# Patient Record
Sex: MALE | Race: WHITE | NOT HISPANIC OR LATINO | Employment: OTHER | URBAN - METROPOLITAN AREA
[De-identification: names, ages, dates, MRNs, and addresses within clinical notes are randomized per-mention and may not be internally consistent; named-entity substitution may affect disease eponyms.]

---

## 2023-09-08 ENCOUNTER — OFFICE VISIT (OUTPATIENT)
Dept: PRIMARY CARE | Facility: CLINIC | Age: 68
End: 2023-09-08
Payer: MEDICARE

## 2023-09-08 ENCOUNTER — LAB (OUTPATIENT)
Dept: LAB | Facility: LAB | Age: 68
End: 2023-09-08
Payer: MEDICARE

## 2023-09-08 VITALS
WEIGHT: 143 LBS | TEMPERATURE: 97.7 F | HEIGHT: 71 IN | SYSTOLIC BLOOD PRESSURE: 136 MMHG | HEART RATE: 68 BPM | BODY MASS INDEX: 20.02 KG/M2 | DIASTOLIC BLOOD PRESSURE: 80 MMHG

## 2023-09-08 DIAGNOSIS — Z12.11 SCREENING FOR COLON CANCER: ICD-10-CM

## 2023-09-08 DIAGNOSIS — Z12.5 SCREENING FOR PROSTATE CANCER: ICD-10-CM

## 2023-09-08 DIAGNOSIS — J45.20 MILD INTERMITTENT ASTHMA, UNSPECIFIED WHETHER COMPLICATED (HHS-HCC): ICD-10-CM

## 2023-09-08 DIAGNOSIS — R53.81 MALAISE: ICD-10-CM

## 2023-09-08 DIAGNOSIS — R03.0 PREHYPERTENSION: ICD-10-CM

## 2023-09-08 DIAGNOSIS — E55.9 VITAMIN D DEFICIENCY: ICD-10-CM

## 2023-09-08 DIAGNOSIS — G20.A1 PARKINSON'S DISEASE (MULTI): Primary | ICD-10-CM

## 2023-09-08 DIAGNOSIS — E78.2 HYPERLIPEMIA, MIXED: ICD-10-CM

## 2023-09-08 DIAGNOSIS — E46 PROTEIN-CALORIE MALNUTRITION, UNSPECIFIED SEVERITY (MULTI): ICD-10-CM

## 2023-09-08 DIAGNOSIS — Z23 NEED FOR INFLUENZA VACCINATION: ICD-10-CM

## 2023-09-08 DIAGNOSIS — R97.20 ELEVATED PSA: ICD-10-CM

## 2023-09-08 PROBLEM — J45.909 ASTHMA (HHS-HCC): Status: ACTIVE | Noted: 2023-09-08

## 2023-09-08 PROBLEM — N52.9 ERECTILE DYSFUNCTION: Status: ACTIVE | Noted: 2023-09-08

## 2023-09-08 LAB
ALANINE AMINOTRANSFERASE (SGPT) (U/L) IN SER/PLAS: 15 U/L (ref 10–52)
ALBUMIN (G/DL) IN SER/PLAS: 4.3 G/DL (ref 3.4–5)
ALBUMIN (MG/L) IN URINE: <7 MG/L
ALBUMIN/CREATININE (UG/MG) IN URINE: NORMAL UG/MG CRT (ref 0–30)
ALKALINE PHOSPHATASE (U/L) IN SER/PLAS: 75 U/L (ref 33–136)
ANION GAP IN SER/PLAS: 13 MMOL/L (ref 10–20)
APPEARANCE, URINE: CLEAR
ASPARTATE AMINOTRANSFERASE (SGOT) (U/L) IN SER/PLAS: 16 U/L (ref 9–39)
BASOPHILS (10*3/UL) IN BLOOD BY AUTOMATED COUNT: 0.05 X10E9/L (ref 0–0.1)
BASOPHILS/100 LEUKOCYTES IN BLOOD BY AUTOMATED COUNT: 1 % (ref 0–2)
BILIRUBIN TOTAL (MG/DL) IN SER/PLAS: 0.7 MG/DL (ref 0–1.2)
BILIRUBIN, URINE: NEGATIVE
BLOOD, URINE: NEGATIVE
CALCIDIOL (25 OH VITAMIN D3) (NG/ML) IN SER/PLAS: 50 NG/ML
CALCIUM (MG/DL) IN SER/PLAS: 9.7 MG/DL (ref 8.6–10.6)
CARBON DIOXIDE, TOTAL (MMOL/L) IN SER/PLAS: 31 MMOL/L (ref 21–32)
CHLORIDE (MMOL/L) IN SER/PLAS: 103 MMOL/L (ref 98–107)
CHOLESTEROL (MG/DL) IN SER/PLAS: 145 MG/DL (ref 0–199)
CHOLESTEROL IN HDL (MG/DL) IN SER/PLAS: 72 MG/DL
CHOLESTEROL/HDL RATIO: 2
COLOR, URINE: YELLOW
CREATININE (MG/DL) IN SER/PLAS: 0.85 MG/DL (ref 0.5–1.3)
CREATININE (MG/DL) IN URINE: 90.1 MG/DL (ref 20–370)
EOSINOPHILS (10*3/UL) IN BLOOD BY AUTOMATED COUNT: 0.11 X10E9/L (ref 0–0.7)
EOSINOPHILS/100 LEUKOCYTES IN BLOOD BY AUTOMATED COUNT: 2.2 % (ref 0–6)
ERYTHROCYTE DISTRIBUTION WIDTH (RATIO) BY AUTOMATED COUNT: 13 % (ref 11.5–14.5)
ERYTHROCYTE MEAN CORPUSCULAR HEMOGLOBIN CONCENTRATION (G/DL) BY AUTOMATED: 32.9 G/DL (ref 32–36)
ERYTHROCYTE MEAN CORPUSCULAR VOLUME (FL) BY AUTOMATED COUNT: 96 FL (ref 80–100)
ERYTHROCYTES (10*6/UL) IN BLOOD BY AUTOMATED COUNT: 4.39 X10E12/L (ref 4.5–5.9)
GFR MALE: >90 ML/MIN/1.73M2
GLUCOSE (MG/DL) IN SER/PLAS: 79 MG/DL (ref 74–99)
GLUCOSE, URINE: NEGATIVE MG/DL
HEMATOCRIT (%) IN BLOOD BY AUTOMATED COUNT: 42 % (ref 41–52)
HEMOGLOBIN (G/DL) IN BLOOD: 13.8 G/DL (ref 13.5–17.5)
IMMATURE GRANULOCYTES/100 LEUKOCYTES IN BLOOD BY AUTOMATED COUNT: 0 % (ref 0–0.9)
KETONES, URINE: NEGATIVE MG/DL
LDL: 64 MG/DL (ref 0–99)
LEUKOCYTE ESTERASE, URINE: NEGATIVE
LEUKOCYTES (10*3/UL) IN BLOOD BY AUTOMATED COUNT: 5 X10E9/L (ref 4.4–11.3)
LYMPHOCYTES (10*3/UL) IN BLOOD BY AUTOMATED COUNT: 2.24 X10E9/L (ref 1.2–4.8)
LYMPHOCYTES/100 LEUKOCYTES IN BLOOD BY AUTOMATED COUNT: 45.1 % (ref 13–44)
MONOCYTES (10*3/UL) IN BLOOD BY AUTOMATED COUNT: 0.5 X10E9/L (ref 0.1–1)
MONOCYTES/100 LEUKOCYTES IN BLOOD BY AUTOMATED COUNT: 10.1 % (ref 2–10)
NEUTROPHILS (10*3/UL) IN BLOOD BY AUTOMATED COUNT: 2.07 X10E9/L (ref 1.2–7.7)
NEUTROPHILS/100 LEUKOCYTES IN BLOOD BY AUTOMATED COUNT: 41.6 % (ref 40–80)
NITRITE, URINE: NEGATIVE
NRBC (PER 100 WBCS) BY AUTOMATED COUNT: 0 /100 WBC (ref 0–0)
PH, URINE: 7 (ref 5–8)
PLATELETS (10*3/UL) IN BLOOD AUTOMATED COUNT: 206 X10E9/L (ref 150–450)
POTASSIUM (MMOL/L) IN SER/PLAS: 4 MMOL/L (ref 3.5–5.3)
PROSTATE SPECIFIC AG (NG/ML) IN SER/PLAS: 5.04 NG/ML (ref 0–4)
PROTEIN TOTAL: 7.2 G/DL (ref 6.4–8.2)
PROTEIN, URINE: NEGATIVE MG/DL
SODIUM (MMOL/L) IN SER/PLAS: 143 MMOL/L (ref 136–145)
SPECIFIC GRAVITY, URINE: 1.02 (ref 1–1.03)
THYROTROPIN (MIU/L) IN SER/PLAS BY DETECTION LIMIT <= 0.05 MIU/L: 4.24 MIU/L (ref 0.44–3.98)
THYROXINE (T4) FREE (NG/DL) IN SER/PLAS: 1.2 NG/DL (ref 0.78–1.48)
TRIGLYCERIDE (MG/DL) IN SER/PLAS: 47 MG/DL (ref 0–149)
URATE (MG/DL) IN SER/PLAS: 6 MG/DL (ref 4–7.5)
UREA NITROGEN (MG/DL) IN SER/PLAS: 26 MG/DL (ref 6–23)
UROBILINOGEN, URINE: <2 MG/DL (ref 0–1.9)
VLDL: 9 MG/DL (ref 0–40)

## 2023-09-08 PROCEDURE — 1160F RVW MEDS BY RX/DR IN RCRD: CPT | Performed by: INTERNAL MEDICINE

## 2023-09-08 PROCEDURE — 80061 LIPID PANEL: CPT

## 2023-09-08 PROCEDURE — 85025 COMPLETE CBC W/AUTO DIFF WBC: CPT

## 2023-09-08 PROCEDURE — 93000 ELECTROCARDIOGRAM COMPLETE: CPT | Performed by: INTERNAL MEDICINE

## 2023-09-08 PROCEDURE — 82306 VITAMIN D 25 HYDROXY: CPT

## 2023-09-08 PROCEDURE — 99397 PER PM REEVAL EST PAT 65+ YR: CPT | Performed by: INTERNAL MEDICINE

## 2023-09-08 PROCEDURE — 82570 ASSAY OF URINE CREATININE: CPT

## 2023-09-08 PROCEDURE — G0103 PSA SCREENING: HCPCS

## 2023-09-08 PROCEDURE — 84550 ASSAY OF BLOOD/URIC ACID: CPT

## 2023-09-08 PROCEDURE — 84443 ASSAY THYROID STIM HORMONE: CPT

## 2023-09-08 PROCEDURE — 90662 IIV NO PRSV INCREASED AG IM: CPT | Performed by: INTERNAL MEDICINE

## 2023-09-08 PROCEDURE — 36415 COLL VENOUS BLD VENIPUNCTURE: CPT

## 2023-09-08 PROCEDURE — 80053 COMPREHEN METABOLIC PANEL: CPT

## 2023-09-08 PROCEDURE — 84439 ASSAY OF FREE THYROXINE: CPT

## 2023-09-08 PROCEDURE — 1036F TOBACCO NON-USER: CPT | Performed by: INTERNAL MEDICINE

## 2023-09-08 PROCEDURE — G0008 ADMIN INFLUENZA VIRUS VAC: HCPCS | Performed by: INTERNAL MEDICINE

## 2023-09-08 PROCEDURE — 82043 UR ALBUMIN QUANTITATIVE: CPT

## 2023-09-08 PROCEDURE — 81003 URINALYSIS AUTO W/O SCOPE: CPT

## 2023-09-08 PROCEDURE — 1159F MED LIST DOCD IN RCRD: CPT | Performed by: INTERNAL MEDICINE

## 2023-09-08 RX ORDER — AZELASTINE 1 MG/ML
1 SPRAY, METERED NASAL
COMMUNITY
Start: 2021-11-15

## 2023-09-08 RX ORDER — FLUTICASONE PROPIONATE 50 MCG
2 SPRAY, SUSPENSION (ML) NASAL DAILY
COMMUNITY
Start: 2021-02-09

## 2023-09-08 RX ORDER — DORZOLAMIDE HYDROCHLORIDE AND TIMOLOL MALEATE 20; 5 MG/ML; MG/ML
1 SOLUTION/ DROPS OPHTHALMIC 2 TIMES DAILY
COMMUNITY
Start: 2023-05-22

## 2023-09-08 SDOH — HEALTH STABILITY: PHYSICAL HEALTH: ON AVERAGE, HOW MANY DAYS PER WEEK DO YOU ENGAGE IN MODERATE TO STRENUOUS EXERCISE (LIKE A BRISK WALK)?: 7 DAYS

## 2023-09-08 SDOH — HEALTH STABILITY: PHYSICAL HEALTH: ON AVERAGE, HOW MANY MINUTES DO YOU ENGAGE IN EXERCISE AT THIS LEVEL?: 30 MIN

## 2023-09-08 ASSESSMENT — ENCOUNTER SYMPTOMS
ANAL BLEEDING: 0
CHOKING: 0
SORE THROAT: 0
CONSTIPATION: 0
FATIGUE: 0
POLYPHAGIA: 0
POLYDIPSIA: 0
BLOOD IN STOOL: 0
SLEEP DISTURBANCE: 0
TREMORS: 1
SEIZURES: 0
DIZZINESS: 0
RECTAL PAIN: 0
FACIAL SWELLING: 0
SINUS PAIN: 0
EYE ITCHING: 0
EYE PAIN: 0
ABDOMINAL PAIN: 0
WEAKNESS: 0
RHINORRHEA: 1
PALPITATIONS: 0
HEADACHES: 0
ARTHRALGIAS: 1
LIGHT-HEADEDNESS: 0
APPETITE CHANGE: 0
NECK STIFFNESS: 0
BRUISES/BLEEDS EASILY: 0
NECK PAIN: 0
ACTIVITY CHANGE: 0
DIAPHORESIS: 0
NUMBNESS: 0
SPEECH DIFFICULTY: 0
COLOR CHANGE: 0
COUGH: 0
PHOTOPHOBIA: 0
WHEEZING: 0
EYE DISCHARGE: 0
ABDOMINAL DISTENTION: 0
DYSURIA: 0
HEMATURIA: 0
SHORTNESS OF BREATH: 0
DIFFICULTY URINATING: 0
FLANK PAIN: 0
SINUS PRESSURE: 0
CHILLS: 0
STRIDOR: 0
BACK PAIN: 0
VOMITING: 0
DIARRHEA: 0
VOICE CHANGE: 0
EYE REDNESS: 0
ADENOPATHY: 0
MYALGIAS: 0
CHEST TIGHTNESS: 0
NAUSEA: 0
FACIAL ASYMMETRY: 0
JOINT SWELLING: 0
FREQUENCY: 1
TROUBLE SWALLOWING: 0
WOUND: 0

## 2023-09-08 ASSESSMENT — PATIENT HEALTH QUESTIONNAIRE - PHQ9
SUM OF ALL RESPONSES TO PHQ9 QUESTIONS 1 & 2: 0
2. FEELING DOWN, DEPRESSED OR HOPELESS: NOT AT ALL
1. LITTLE INTEREST OR PLEASURE IN DOING THINGS: NOT AT ALL

## 2023-09-08 ASSESSMENT — LIFESTYLE VARIABLES
HOW OFTEN DO YOU HAVE A DRINK CONTAINING ALCOHOL: NEVER
SKIP TO QUESTIONS 9-10: 1
HOW OFTEN DO YOU HAVE SIX OR MORE DRINKS ON ONE OCCASION: NEVER
AUDIT-C TOTAL SCORE: 0
HOW MANY STANDARD DRINKS CONTAINING ALCOHOL DO YOU HAVE ON A TYPICAL DAY: PATIENT DOES NOT DRINK

## 2023-09-08 NOTE — PATIENT INSTRUCTIONS
Please obtain fasting blood work and urine.  Schedule appointment with neurology.  Schedule your colonoscopy.  Follow-up in 6 months.  Keep appointment with urology

## 2023-09-08 NOTE — PROGRESS NOTES
Subjective   Patient ID: Mino Fernandez is a 68 y.o. male who presents for Medicare Annual Wellness Visit Subsequent (Pt present today for wellness visit. ls).    Patient presents for physical exam.  He has been compliant with his medications, diet and exercise.  He reports that his tremor is slightly worse.  He overall feels well.  He denies any headaches, no dizziness, but does complain of allergy symptoms.  He denies any chest pain or shortness of breath, no abdominal pain no nausea vomiting or diarrhea.  He does complain of occasional shin pain.  He reports no other new musculoskeletal complaints.  His urinary symptoms have been stable.       Review of Systems   Constitutional:  Negative for activity change, appetite change, chills, diaphoresis and fatigue.   HENT:  Positive for congestion and rhinorrhea. Negative for dental problem, drooling, ear discharge, ear pain, facial swelling, hearing loss, mouth sores, nosebleeds, postnasal drip, sinus pressure, sinus pain, sneezing, sore throat, tinnitus, trouble swallowing and voice change.    Eyes:  Negative for photophobia, pain, discharge, redness, itching and visual disturbance.   Respiratory:  Negative for cough, choking, chest tightness, shortness of breath, wheezing and stridor.    Cardiovascular:  Negative for chest pain, palpitations and leg swelling.   Gastrointestinal:  Negative for abdominal distention, abdominal pain, anal bleeding, blood in stool, constipation, diarrhea, nausea, rectal pain and vomiting.   Endocrine: Negative for cold intolerance, heat intolerance, polydipsia, polyphagia and polyuria.   Genitourinary:  Positive for frequency. Negative for decreased urine volume, difficulty urinating, dysuria, enuresis, flank pain, genital sores, hematuria and urgency.   Musculoskeletal:  Positive for arthralgias. Negative for back pain, gait problem, joint swelling, myalgias, neck pain and neck stiffness.   Skin:  Negative for color change, pallor, rash  "and wound.   Neurological:  Positive for tremors. Negative for dizziness, seizures, syncope, facial asymmetry, speech difficulty, weakness, light-headedness, numbness and headaches.   Hematological:  Negative for adenopathy. Does not bruise/bleed easily.   Psychiatric/Behavioral:  Negative for sleep disturbance.        Objective   /80   Pulse 68   Temp 36.5 °C (97.7 °F)   Ht 1.791 m (5' 10.5\")   Wt 64.9 kg (143 lb)   BMI 20.23 kg/m²     Physical Exam  Constitutional:       General: He is not in acute distress.     Appearance: Normal appearance. He is normal weight. He is not ill-appearing, toxic-appearing or diaphoretic.   HENT:      Head: Normocephalic.      Right Ear: Tympanic membrane, ear canal and external ear normal. There is no impacted cerumen.      Left Ear: Tympanic membrane, ear canal and external ear normal. There is no impacted cerumen.      Nose: Nose normal. No congestion or rhinorrhea.      Mouth/Throat:      Mouth: Mucous membranes are moist.      Pharynx: No oropharyngeal exudate or posterior oropharyngeal erythema.   Eyes:      General: No scleral icterus.        Right eye: No discharge.         Left eye: No discharge.      Extraocular Movements: Extraocular movements intact.      Conjunctiva/sclera: Conjunctivae normal.      Pupils: Pupils are equal, round, and reactive to light.   Neck:      Vascular: No carotid bruit.   Cardiovascular:      Rate and Rhythm: Normal rate and regular rhythm.      Pulses: Normal pulses.      Heart sounds: Normal heart sounds. No murmur heard.     No friction rub. No gallop.   Pulmonary:      Effort: Pulmonary effort is normal. No respiratory distress.      Breath sounds: Normal breath sounds. No stridor. No wheezing, rhonchi or rales.   Chest:      Chest wall: No tenderness.   Abdominal:      General: There is no distension.      Palpations: There is no mass.      Tenderness: There is no abdominal tenderness. There is no right CVA tenderness, left CVA " tenderness or guarding.      Hernia: No hernia is present.   Genitourinary:     Penis: Normal.       Testes: Normal.   Musculoskeletal:         General: No swelling, tenderness, deformity or signs of injury.      Cervical back: Normal range of motion and neck supple. No rigidity or tenderness.      Right lower leg: No edema.      Left lower leg: No edema.   Lymphadenopathy:      Cervical: No cervical adenopathy.   Skin:     General: Skin is warm and dry.      Coloration: Skin is not jaundiced or pale.      Findings: No bruising, erythema, lesion or rash.      Comments: Multiple moles present   Neurological:      General: No focal deficit present.      Mental Status: He is alert and oriented to person, place, and time. Mental status is at baseline.      Cranial Nerves: No cranial nerve deficit.      Sensory: No sensory deficit.      Motor: No weakness.      Coordination: Coordination normal.      Gait: Gait normal.      Deep Tendon Reflexes: Reflexes normal.      Comments: Tremor present   Psychiatric:         Mood and Affect: Mood normal.         Behavior: Behavior normal.         Thought Content: Thought content normal.         Judgment: Judgment normal.       Assessment/Plan   Diagnoses and all orders for this visit:  Parkinson's disease (CMS/HCC)-we will refer to neurology for further evaluation  Protein-calorie malnutrition, unspecified severity (CMS/HCC)-encourage increased p.o. intake  Hyperlipemia, mixed-check a fasting lipid profile.  -     Lipid Panel; Future  -     ECG 12 Lead  Prehypertension-blood pressure is lower at home.  Follow low-salt diet and exercise  -     Albumin , Urine Random; Future  -     Comprehensive Metabolic Panel; Future  -     Uric Acid; Future  -     Urinalysis with Reflex Microscopic; Future  Malaise  -     CBC and Auto Differential; Future  -     TSH with reflex to Free T4 if abnormal; Future  Screening for prostate cancer  -     Prostate Specific Antigen; Future  Vitamin D  deficiency  -     Vitamin D 25-Hydroxy,Total (for eval of Vitamin D levels); Future  Need for influenza vaccination  -     Flu vaccine, quadrivalent, high-dose, preservative free, age 65y+ (FLUZONE)  Screening for colon cancer  -     Colonoscopy Screening; Future  Elevated PSA-recheck today.  Follow-up with urology.  MRI is pending.  Mild intermittent asthma, unspecified whether complicated-stable symptoms.  Health maintenance-we will schedule colonoscopy.  We will give a flu shot today.  Dermatology appointment has been done.  Glaucoma-he will follow-up with ophthalmology..

## 2023-10-02 DIAGNOSIS — Z00.00 HEALTHCARE MAINTENANCE: Primary | ICD-10-CM

## 2023-10-02 RX ORDER — POLYETHYLENE GLYCOL 3350, SODIUM SULFATE ANHYDROUS, SODIUM BICARBONATE, SODIUM CHLORIDE, POTASSIUM CHLORIDE 236; 22.74; 6.74; 5.86; 2.97 G/4L; G/4L; G/4L; G/4L; G/4L
4000 POWDER, FOR SOLUTION ORAL ONCE
Qty: 4000 ML | Refills: 0 | Status: SHIPPED | OUTPATIENT
Start: 2023-10-02 | End: 2023-10-02

## 2023-10-11 ENCOUNTER — ANCILLARY PROCEDURE (OUTPATIENT)
Dept: RADIOLOGY | Facility: CLINIC | Age: 68
End: 2023-10-11
Payer: MEDICARE

## 2023-10-11 DIAGNOSIS — R97.20 ELEVATED PROSTATE SPECIFIC ANTIGEN (PSA): ICD-10-CM

## 2023-10-11 DIAGNOSIS — Z12.5 ENCOUNTER FOR SCREENING FOR MALIGNANT NEOPLASM OF PROSTATE: ICD-10-CM

## 2023-10-11 PROCEDURE — 6100000002 HC SELF-PAY SCREENING FAST MRI PELVIC

## 2023-11-06 PROBLEM — L57.9 SKIN CHANGES DUE TO CHRONIC EXPOSURE TO NONIONIZING RADIATION, UNSPECIFIED: Status: ACTIVE | Noted: 2022-11-09

## 2023-11-06 PROBLEM — L82.1 OTHER SEBORRHEIC KERATOSIS: Status: ACTIVE | Noted: 2022-11-09

## 2023-11-06 PROBLEM — H40.9 GLAUCOMA: Status: ACTIVE | Noted: 2023-11-06

## 2023-11-06 PROBLEM — D22.71 MELANOCYTIC NEVI OF RIGHT LOWER LIMB, INCLUDING HIP: Status: ACTIVE | Noted: 2022-11-09

## 2023-11-06 PROBLEM — D18.01 HEMANGIOMA OF SKIN AND SUBCUTANEOUS TISSUE: Status: ACTIVE | Noted: 2022-11-09

## 2023-11-06 PROBLEM — I10 WHITE COAT SYNDROME WITH HYPERTENSION: Status: ACTIVE | Noted: 2023-11-06

## 2023-11-06 PROBLEM — R53.81 MALAISE: Status: ACTIVE | Noted: 2023-11-06

## 2023-11-06 PROBLEM — J31.0 RHINITIS: Status: ACTIVE | Noted: 2022-10-11

## 2023-11-06 PROBLEM — D22.5 MELANOCYTIC NEVI OF TRUNK: Status: ACTIVE | Noted: 2022-11-09

## 2023-11-06 PROBLEM — K21.9 ESOPHAGEAL REFLUX: Status: ACTIVE | Noted: 2023-11-06

## 2023-11-06 PROBLEM — G20.A1 PARKINSON DISEASE (MULTI): Status: ACTIVE | Noted: 2023-11-06

## 2023-11-06 PROBLEM — G25.0 ESSENTIAL TREMOR: Status: ACTIVE | Noted: 2023-11-06

## 2023-11-06 PROBLEM — R25.1 TREMOR: Status: ACTIVE | Noted: 2022-10-11

## 2023-11-06 PROBLEM — D48.5 NEOPLASM OF UNCERTAIN BEHAVIOR OF SKIN: Status: ACTIVE | Noted: 2022-11-09

## 2023-11-06 PROBLEM — D22.60 MELANOCYTIC NEVI OF UNSPECIFIED UPPER LIMB, INCLUDING SHOULDER: Status: ACTIVE | Noted: 2022-11-09

## 2023-11-06 PROBLEM — L57.3 POIKILODERMA OF CIVATTE: Status: ACTIVE | Noted: 2022-11-09

## 2023-11-06 PROBLEM — L81.4 OTHER MELANIN HYPERPIGMENTATION: Status: ACTIVE | Noted: 2022-11-09

## 2023-11-06 PROBLEM — R13.10 DYSPHAGIA: Status: ACTIVE | Noted: 2022-10-11

## 2023-11-06 PROBLEM — R12 HEARTBURN: Status: ACTIVE | Noted: 2023-11-06

## 2023-11-06 PROBLEM — L82.1 SEBORRHEIC KERATOSIS: Status: ACTIVE | Noted: 2022-11-09

## 2023-11-06 PROBLEM — D22.30 MELANOCYTIC NEVI OF UNSPECIFIED PART OF FACE: Status: ACTIVE | Noted: 2022-11-09

## 2023-11-06 PROBLEM — E55.9 VITAMIN D DEFICIENCY: Status: ACTIVE | Noted: 2023-11-06

## 2023-11-06 RX ORDER — ALBUTEROL SULFATE 90 UG/1
AEROSOL, METERED RESPIRATORY (INHALATION)
COMMUNITY

## 2023-11-06 RX ORDER — LATANOPROST 50 UG/ML
SOLUTION/ DROPS OPHTHALMIC
COMMUNITY
Start: 2017-12-04

## 2023-11-06 RX ORDER — MULTIVITAMIN
TABLET ORAL
COMMUNITY

## 2023-11-06 RX ORDER — TADALAFIL 20 MG/1
TABLET ORAL
COMMUNITY
Start: 2021-07-15 | End: 2023-11-28 | Stop reason: WASHOUT

## 2023-11-06 RX ORDER — PANTOPRAZOLE SODIUM 40 MG/1
40 TABLET, DELAYED RELEASE ORAL DAILY
COMMUNITY
Start: 2021-07-12 | End: 2023-11-28 | Stop reason: WASHOUT

## 2023-11-06 RX ORDER — CHOLECALCIFEROL (VITAMIN D3) 50 MCG
TABLET ORAL
COMMUNITY

## 2023-11-06 RX ORDER — OMEPRAZOLE 20 MG/1
TABLET, DELAYED RELEASE ORAL
COMMUNITY
End: 2023-11-28 | Stop reason: WASHOUT

## 2023-11-06 RX ORDER — ASCORBIC ACID 250 MG
TABLET ORAL
COMMUNITY

## 2023-11-06 RX ORDER — SILDENAFIL 100 MG/1
TABLET, FILM COATED ORAL
COMMUNITY
Start: 2016-10-24 | End: 2023-11-28 | Stop reason: WASHOUT

## 2023-11-06 RX ORDER — POLYETHYLENE GLYCOL-3350 AND ELECTROLYTES 236; 6.74; 5.86; 2.97; 22.74 G/274.31G; G/274.31G; G/274.31G; G/274.31G; G/274.31G
POWDER, FOR SOLUTION ORAL
COMMUNITY
Start: 2023-10-02 | End: 2023-11-28 | Stop reason: WASHOUT

## 2023-11-08 ENCOUNTER — OFFICE VISIT (OUTPATIENT)
Dept: DERMATOLOGY | Facility: CLINIC | Age: 68
End: 2023-11-08
Payer: MEDICARE

## 2023-11-08 DIAGNOSIS — D22.9 MULTIPLE BENIGN NEVI: ICD-10-CM

## 2023-11-08 DIAGNOSIS — D18.01 HEMANGIOMA OF SKIN: ICD-10-CM

## 2023-11-08 DIAGNOSIS — L85.3 XEROSIS OF SKIN: ICD-10-CM

## 2023-11-08 DIAGNOSIS — L82.1 SEBORRHEIC KERATOSIS: Primary | ICD-10-CM

## 2023-11-08 DIAGNOSIS — L81.4 LENTIGO: ICD-10-CM

## 2023-11-08 DIAGNOSIS — L29.9 PRURITUS: ICD-10-CM

## 2023-11-08 DIAGNOSIS — L21.9 SEBORRHEIC DERMATITIS: ICD-10-CM

## 2023-11-08 DIAGNOSIS — Z12.83 SCREENING EXAM FOR SKIN CANCER: ICD-10-CM

## 2023-11-08 PROCEDURE — 3079F DIAST BP 80-89 MM HG: CPT | Performed by: DERMATOLOGY

## 2023-11-08 PROCEDURE — 1160F RVW MEDS BY RX/DR IN RCRD: CPT | Performed by: DERMATOLOGY

## 2023-11-08 PROCEDURE — 3075F SYST BP GE 130 - 139MM HG: CPT | Performed by: DERMATOLOGY

## 2023-11-08 PROCEDURE — 1036F TOBACCO NON-USER: CPT | Performed by: DERMATOLOGY

## 2023-11-08 PROCEDURE — 1159F MED LIST DOCD IN RCRD: CPT | Performed by: DERMATOLOGY

## 2023-11-08 PROCEDURE — 99214 OFFICE O/P EST MOD 30 MIN: CPT | Performed by: DERMATOLOGY

## 2023-11-08 RX ORDER — KETOCONAZOLE 20 MG/ML
SHAMPOO, SUSPENSION TOPICAL 3 TIMES WEEKLY
Qty: 120 ML | Refills: 11 | Status: SHIPPED | OUTPATIENT
Start: 2023-11-08

## 2023-11-08 RX ORDER — KETOCONAZOLE 20 MG/G
CREAM TOPICAL 2 TIMES DAILY
Qty: 60 G | Refills: 11 | Status: SHIPPED | OUTPATIENT
Start: 2023-11-08 | End: 2024-11-07

## 2023-11-08 ASSESSMENT — DERMATOLOGY PATIENT ASSESSMENT
DO YOU USE A TANNING BED: NO
ARE YOU AN ORGAN TRANSPLANT RECIPIENT: NO
HAVE YOU HAD OR DO YOU HAVE A STAPH INFECTION: NO
HAVE YOU HAD OR DO YOU HAVE VASCULAR DISEASE: NO
DO YOU HAVE ANY NEW OR CHANGING LESIONS: NO

## 2023-11-08 ASSESSMENT — PATIENT GLOBAL ASSESSMENT (PGA): PATIENT GLOBAL ASSESSMENT: PATIENT GLOBAL ASSESSMENT:  1 - CLEAR

## 2023-11-08 ASSESSMENT — DERMATOLOGY QUALITY OF LIFE (QOL) ASSESSMENT
RATE HOW EMOTIONALLY BOTHERED YOU ARE BY YOUR SKIN PROBLEM (FOR EXAMPLE, WORRY, EMBARRASSMENT, FRUSTRATION): 0 - NEVER BOTHERED
RATE HOW BOTHERED YOU ARE BY SYMPTOMS OF YOUR SKIN PROBLEM (EG, ITCHING, STINGING BURNING, HURTING OR SKIN IRRITATION): 0 - NEVER BOTHERED
ARE THERE EXCLUSIONS OR EXCEPTIONS FOR THE QUALITY OF LIFE ASSESSMENT: NO
DATE THE QUALITY-OF-LIFE ASSESSMENT WAS COMPLETED: 66786
RATE HOW BOTHERED YOU ARE BY EFFECTS OF YOUR SKIN PROBLEMS ON YOUR ACTIVITIES (EG, GOING OUT, ACCOMPLISHING WHAT YOU WANT, WORK ACTIVITIES OR YOUR RELATIONSHIPS WITH OTHERS): 0 - NEVER BOTHERED

## 2023-11-08 ASSESSMENT — ITCH NUMERIC RATING SCALE: HOW SEVERE IS YOUR ITCHING?: 0

## 2023-11-08 NOTE — PROGRESS NOTES
Subjective     Mino Fernandez is a 68 y.o. male who presents for the following: Skin Check.     Patient returns to clinic for yearly FBSE. Last skin check 11/10/22. At last visit, biopsied a lesion on patient's right upper arm which showed a lentigo and inflamed hair follicle. Patient denies any lesions of concern today.      Review of Systems:  No other skin or systemic complaints other than what is documented elsewhere in the note.    The following portions of the chart were reviewed this encounter and updated as appropriate:  Tobacco  Allergies  Meds  Problems  Med Hx  Surg Hx  Fam Hx         Skin Cancer History  -No history of melanoma / NMSC.   - Bx right upper arm 11/2022 Lentigo + inflamed hair follicle      Specialty Problems          Dermatology Problems    Seborrheic dermatitis          Objective   Well appearing patient in no apparent distress; mood and affect are within normal limits.    A full examination was performed including scalp, head, eyes, ears, nose, lips, neck, chest, axillae, abdomen, back, buttocks, bilateral upper extremities, bilateral lower extremities, hands, feet, fingers, toes, fingernails, and toenails. All findings within normal limits unless otherwise noted below.    Assessment/Plan   1. Seborrheic keratosis  Stuck on verrucous, tan-brown papules and plaques.      - Discussed benign nature and that no treatment is necessary unless it becomes painful or increases in size. Patient opts for clinical monitoring at this time.     2. Hemangioma of skin  Scattered cherry-red papule(s).    - Discussed benign nature and that no treatment is necessary unless it becomes painful or increases in size. Patient opts for clinical monitoring at this time.     3. Lentigo  Scattered tan macules in sun-exposed areas.    - Discussed benign nature and that no treatment is necessary unless it becomes painful or increases in size. Patient opts for clinical monitoring at this time.     4. Multiple  benign nevi  Scattered, uniform and benign-appearing, regular brown melanocytic papules and macules.    - Discussed benign nature and that no treatment is necessary unless it becomes painful or increases in size. Patient opts for clinical monitoring at this time.     5. Screening exam for skin cancer    Full body skin exam  -No lesions concerning for malignancy on the remainder the skin exam today   - The ugly duckling sign was discussed. Monitor for any skin lesions that are different in color, shape, or size than others on body  -Sun protection was discussed. Recommend SPF 30+, hats with brims, sun protective clothing, and avoiding sun exposure between 10 AM and 2 PM whenever possible  -Recommend regular skin exams or sooner if new or changing lesions       Related Procedures  Follow Up In Dermatology - Established Patient    6. Seborrheic dermatitis  Erythematous patches with flaking on bilateral eyebrows, bilateral nasolabial folds, and scalp.     Seborrheic dermatitis, face and scalp  -Discussed the etiology and also the chronic and intermittently flaring nature of this condition.   -Also discussed that given patient's history of Parkinson's disease may be more severe or recalcitrant to treatment.   -START ketoconazole 2% cream BID x 2-4 weeks. Can repeat for flaring.   -START ketoconazole 2% shampoo three times per week.     Related Medications  ketoconazole (NIZOral) 2 % cream  Apply topically 2 times a day. For 2-4 weeks. Can repeat for flaring.    ketoconazole (NIZOral) 2 % shampoo  Apply topically 3 (three) times a week.    7. Pruritus  -See below.     8. Xerosis of skin  -Patient reports pruritus of his back.   -Discussed likely due to xerosis.   -Recommended Cerave, aveeno, or vaseline as moisturizer BID.     RTC in 1 year for FBSE.     Esperanza Morrison, DO    I saw and evaluated the patient. I personally obtained the key and critical portions of the history and physical exam or was physically present for  key and critical portions performed by the resident/fellow. I reviewed the resident/fellow's documentation and discussed the patient with the resident/fellow. I agree with the resident/fellow's medical decision making as documented in the note and made changes where appropriate.     Bruna Perez MD

## 2023-11-09 PROBLEM — L57.3 POIKILODERMA OF CIVATTE: Status: RESOLVED | Noted: 2022-11-09 | Resolved: 2023-11-09

## 2023-11-09 PROBLEM — L82.1 SEBORRHEIC KERATOSIS: Status: RESOLVED | Noted: 2022-11-09 | Resolved: 2023-11-09

## 2023-11-09 PROBLEM — D18.01 HEMANGIOMA OF SKIN AND SUBCUTANEOUS TISSUE: Status: RESOLVED | Noted: 2022-11-09 | Resolved: 2023-11-09

## 2023-11-09 PROBLEM — D22.30 MELANOCYTIC NEVI OF UNSPECIFIED PART OF FACE: Status: RESOLVED | Noted: 2022-11-09 | Resolved: 2023-11-09

## 2023-11-09 PROBLEM — D48.5 NEOPLASM OF UNCERTAIN BEHAVIOR OF SKIN: Status: RESOLVED | Noted: 2022-11-09 | Resolved: 2023-11-09

## 2023-11-09 PROBLEM — L21.9 SEBORRHEIC DERMATITIS: Status: ACTIVE | Noted: 2023-11-09

## 2023-11-09 PROBLEM — L82.1 OTHER SEBORRHEIC KERATOSIS: Status: RESOLVED | Noted: 2022-11-09 | Resolved: 2023-11-09

## 2023-11-09 PROBLEM — D22.60 MELANOCYTIC NEVI OF UNSPECIFIED UPPER LIMB, INCLUDING SHOULDER: Status: RESOLVED | Noted: 2022-11-09 | Resolved: 2023-11-09

## 2023-11-09 PROBLEM — L81.4 OTHER MELANIN HYPERPIGMENTATION: Status: RESOLVED | Noted: 2022-11-09 | Resolved: 2023-11-09

## 2023-11-09 PROBLEM — D22.71 MELANOCYTIC NEVI OF RIGHT LOWER LIMB, INCLUDING HIP: Status: RESOLVED | Noted: 2022-11-09 | Resolved: 2023-11-09

## 2023-11-09 PROBLEM — D22.5 MELANOCYTIC NEVI OF TRUNK: Status: RESOLVED | Noted: 2022-11-09 | Resolved: 2023-11-09

## 2023-11-09 PROBLEM — L57.9 SKIN CHANGES DUE TO CHRONIC EXPOSURE TO NONIONIZING RADIATION, UNSPECIFIED: Status: RESOLVED | Noted: 2022-11-09 | Resolved: 2023-11-09

## 2023-11-28 ENCOUNTER — OFFICE VISIT (OUTPATIENT)
Dept: NEUROLOGY | Facility: CLINIC | Age: 68
End: 2023-11-28
Payer: MEDICARE

## 2023-11-28 VITALS
DIASTOLIC BLOOD PRESSURE: 75 MMHG | WEIGHT: 147 LBS | BODY MASS INDEX: 20.5 KG/M2 | HEART RATE: 80 BPM | RESPIRATION RATE: 16 BRPM | SYSTOLIC BLOOD PRESSURE: 131 MMHG

## 2023-11-28 DIAGNOSIS — G20.A1 PARKINSON'S DISEASE WITHOUT DYSKINESIA OR FLUCTUATING MANIFESTATIONS (MULTI): Primary | ICD-10-CM

## 2023-11-28 PROCEDURE — 3078F DIAST BP <80 MM HG: CPT | Performed by: PSYCHIATRY & NEUROLOGY

## 2023-11-28 PROCEDURE — 1159F MED LIST DOCD IN RCRD: CPT | Performed by: PSYCHIATRY & NEUROLOGY

## 2023-11-28 PROCEDURE — 3075F SYST BP GE 130 - 139MM HG: CPT | Performed by: PSYCHIATRY & NEUROLOGY

## 2023-11-28 PROCEDURE — 1160F RVW MEDS BY RX/DR IN RCRD: CPT | Performed by: PSYCHIATRY & NEUROLOGY

## 2023-11-28 PROCEDURE — 99203 OFFICE O/P NEW LOW 30 MIN: CPT | Performed by: PSYCHIATRY & NEUROLOGY

## 2023-11-28 PROCEDURE — 1036F TOBACCO NON-USER: CPT | Performed by: PSYCHIATRY & NEUROLOGY

## 2023-11-28 ASSESSMENT — UNIFIED PARKINSONS DISEASE RATING SCALE (UPDRS)
TOETAPPING_RIGHT: 1
FINGER_TAPPING_LEFT: 2
PRONATION_SUPINATION_LEFT: 2
CONSTANCY_TREMOR_ATREST: 3
POSTURAL_TREMOR_RIGHTHAND: 1
AMPLITUDE_RUE: 1
POSTURAL_STABILITY: 0
FACIAL_EXPRESSION: 2
PARKINSONS_MEDS: NO
DYSKINESIAS_PRESENT: NO
CHAIR_RISING_SCALE: 0
GAIT: 1
RIGIDITY_LUE: 2
RIGIDITY_RUE: 2
KINETIC_TREMOR_RIGHTHAND: 0
POSTURAL_TREMOR_LEFTHAND: 1
RIGIDITY_RLE: 0
AMPLITUDE_LLE: 0
RIGIDITY_NECK: 0
KINETIC_TREMOR_LEFTHAND: 1
FINGER_TAPPING_RIGHT: 2
AMPLITUDE_LIP_JAW: 1
HANDMOVEMENTS_RIGHT: 1
LEVODOPA: NO
AMPLITUDE_LUE: 2
TOTAL_SCORE: 34
HOEHN_YAHR: 2
LEG_AGILITY_LEFT: 1
POSTURE: 0
TOETAPPING_LEFT: 2
FREEZING_GAIT: 0
LEG_AGILITY_RIGHT: 1
RIGIDITY_LLE: 0
AMPLITUDE_RLE: 0
SPEECH: 0
PRONATION_SUPINATION_RIGHT: 1
SPONTANEITY_OF_MOVEMENT: 2

## 2023-11-28 ASSESSMENT — PATIENT HEALTH QUESTIONNAIRE - PHQ9
1. LITTLE INTEREST OR PLEASURE IN DOING THINGS: NOT AT ALL
SUM OF ALL RESPONSES TO PHQ9 QUESTIONS 1 AND 2: 0
2. FEELING DOWN, DEPRESSED OR HOPELESS: NOT AT ALL

## 2023-11-28 ASSESSMENT — ENCOUNTER SYMPTOMS
OCCASIONAL FEELINGS OF UNSTEADINESS: 0
LOSS OF SENSATION IN FEET: 0
DEPRESSION: 0

## 2023-11-28 NOTE — PROGRESS NOTES
Subjective     Mino Fernandez is a right handed  68 y.o. year old male who presents with Parkinson's disease.    HPI  Started experiencing L hand tremor 2016. Now has intermittent tremor in right hand for past year. Has been using PT to manage and has not tried medications before. Does note that he had some leg hesitation that improved with dance exercise. No significant impact on activity. Has some increased difficulty with typing, but notes no other significant functional impairment. Notes its worse with cold and anxiety. Usually steady when performing tasks, but notices it when he rests. Denies any history of abnormal sleep behavior. No bed partner, but sleep mar notes no significant movement. No vivid dreams. No anosmia. Memory has been doing well. Does note some delay in recall.  No reported falls.    Was previously dx as Parkinson's disease in 2016 by Dr. Vance based on mild LUE tremor. No head imaging since onset of symptoms    PMH: Asthma, white-coat hypertension, glaucoma    Social Hx: Does not drink. Never smoker. No illicit substance use     Family Hx: 1st cousin w/ Parkinson's (possibly related to agent orange exposure)    Allergies: alcohol, pollen    Patient Health Questionnaire-2 Score: 0          Review of Systems  All other system have been reviewed and are negative for complaint.  Objective   Neurological Exam  Mental Status  Alert. Speech is normal. Language is fluent with no aphasia. Attention and concentration are normal.    Cranial Nerves  CN II: Visual fields full to confrontation.  CN III, IV, VI: Extraocular movements intact bilaterally. Pupils equal round and reactive to light bilaterally.  CN V: Facial sensation is normal.  CN VII: Full and symmetric facial movement.  CN VIII: Hearing is normal.  CN IX, X: Palate elevates symmetrically  CN XI: Shoulder shrug strength is normal.  CN XII: Tongue midline without atrophy or fasciculations. Tremor present.    Motor  Normal muscle bulk  throughout. No fasciculations present. Increased muscle tone. L>R cogwheel rigidity in upper extremities. The following abnormal movements were seen: L>R resting and postural tremor present in BUE. L>R and UE>LE bradykinesia with repetitive movements.   Strength is 5/5 throughout all four extremities.    Sensory  Light touch is normal in upper and lower extremities.     Coordination  Right: Finger-to-nose normal. Heel-to-shin normal.Left: Finger-to-nose normal. Heel-to-shin normal.    Gait  Casual gait: Normal stance. Normal stride length. Reduced right arm swing. Reduced left arm swing.Normal toe walking. Normal heel walking. Normal tandem gait. Romberg is absent. Normal pull test. Able to rise from chair without using arms.      Physical Exam  Constitutional:       Appearance: Normal appearance.   HENT:      Head: Normocephalic and atraumatic.      Mouth/Throat:      Mouth: Mucous membranes are moist.   Eyes:      Extraocular Movements: Extraocular movements intact.      Pupils: Pupils are equal, round, and reactive to light.   Cardiovascular:      Rate and Rhythm: Normal rate.   Pulmonary:      Effort: Pulmonary effort is normal.   Neurological:      Mental Status: He is alert.      Motor: Motor strength is normal.     Coordination: Romberg sign negative.   Psychiatric:         Speech: Speech normal.         MDS UPDRS Score:  Motor Examination  Is the patient on medication for treating the symptoms of Parkinson's Disease?: No  Is the patient on Levodopa?: No  Speech: 0  Facial Expression: 2  Rigidty Neck: 0  Rigidty RUE: 2  Rigidity - LUE: 2  Rigidity RLE: 0  Rigidity LLE: 0  Finger Tapping Right Hand: 2  Finger Tapping Left Hand: 2  Hand Movements- Right Hand: 1  Hand Movements- Left Hand: 2  Pronatiaon-Supination Movments - Right Hand: 1  Pronatiaon-Supination Movments Left Hand: 2  Toe Tapping Right Foot: 1  Toe Tapping - Left Foot: 2  Leg Agility - Right Le  Leg Agility - Left le  Arising from Chair:  0  Gait: 1  Freezing of Gait: 0  Postural Stability: 0  Posture: 0  Global Spontanteity of Movment ( Body Bradykinesia): 2  Postural Tremor - Right Hand: 1  Postural Tremor - Left hand: 1  Kinetic Tremor - Right hand: 0  Kinetic Tremor - Left hand: 1  Rest Tremor Amplitude - RUE: 1  Rest Tremor Amplitude - LUE: 2  Rest Tremor Amplitude - RLE: 0  Rest Tremor Amplitude - LLE: 0  Rest Tremor Amplitude - Lip/Jaw: 1  Constancy of Rest Tremor: 3  MDS UPDRS Total Score: 34  Were dyskinesias (chorea or dystonia) present during examination?: No  Hoen and Yahr Stage: 2                         Assessment/Plan   Mino Fernandez is a right handed  68 y.o. year old male who presents with Parkinson's disease, initially diagnosed 2016. Appears to be tremor predominant subtype with relatively mild progression over past 7 years. Discussed potentially starting medication, but given lack of significant impact on functioning, he would like to hold off at this time and pursue exercise as treatment. Provided information regarding [employer] and [employer] programs. Discussed participation in some studies, which he was agreeable to. Plan to re-evaluate in 6 months to monitor symptom progression.    - Information on exercise programs provided  - Defer medication at this time  - RTC 6 months or sooner ABDULKADIR Noble MD  Neurology PGY-2    Patient was discussed, seen, and evaluated with attending physician Dr. Vance.      I saw and evaluated the patient. I personally obtained the key and critical portions of the history and physical exam or was physically present for key and critical portions performed by the resident/fellow. I reviewed the resident/fellow's documentation and discussed the patient with the resident/fellow. I agree with the resident/fellow's medical decision making as documented in the note.    Pleasant 69yo M with Parkinson's disease.  He has rather classic and unequivocal symptoms, untreated.  Offered levodopa,  given the continuous moderate rest tremor.  Also offered rasagiline or amantadine.  He prefers not to treat at this time.  Discussed exercise and also research studies.  He had interest in our saliva biomarker study and also the PD-GENEration genetic testing study.      Jesus Vance MD

## 2023-11-28 NOTE — LETTER
November 28, 2023     Shon Bermudez MD  [address] [facility] [address]    Patient: Mino Fernandez   YOB: 1955   Date of Visit: 11/28/2023       Dear Dr. Shon Bermudez MD:    Thank you for referring Mino Fernandez to me for evaluation. Below are my notes for this consultation.  If you have questions, please do not hesitate to call me. I look forward to following your patient along with you.       Sincerely,     Jesus Vance MD      CC: No Recipients  ______________________________________________________________________________________    Subjective    Mino Fernandez is a right handed  68 y.o. year old male who presents with Parkinson's disease.    HPI  Started experiencing L hand tremor 2016. Now has intermittent tremor in right hand for past year. Has been using PT to manage and has not tried medications before. Does note that he had some leg hesitation that improved with dance exercise. No significant impact on activity. Has some increased difficulty with typing, but notes no other significant functional impairment. Notes its worse with cold and anxiety. Usually steady when performing tasks, but notices it when he rests. Denies any history of abnormal sleep behavior. No bed partner, but sleep mar notes no significant movement. No vivid dreams. No anosmia. Memory has been doing well. Does note some delay in recall.  No reported falls.    Was previously dx as Parkinson's disease in 2016 by Dr. Vance based on mild LUE tremor. No head imaging since onset of symptoms    PMH: Asthma, white-coat hypertension, glaucoma    Social Hx: Does not drink. Never smoker. No illicit substance use     Family Hx: 1st cousin w/ Parkinson's (possibly related to agent orange exposure)    Allergies: alcohol, pollen    Patient Health Questionnaire-2 Score: 0          Review of Systems  All other system have been reviewed and are negative for complaint.  Objective  Neurological Exam  Mental Status  Alert.  Speech is normal. Language is fluent with no aphasia. Attention and concentration are normal.    Cranial Nerves  CN II: Visual fields full to confrontation.  CN III, IV, VI: Extraocular movements intact bilaterally. Pupils equal round and reactive to light bilaterally.  CN V: Facial sensation is normal.  CN VII: Full and symmetric facial movement.  CN VIII: Hearing is normal.  CN IX, X: Palate elevates symmetrically  CN XI: Shoulder shrug strength is normal.  CN XII: Tongue midline without atrophy or fasciculations. Tremor present.    Motor  Normal muscle bulk throughout. No fasciculations present. Increased muscle tone. L>R cogwheel rigidity in upper extremities. The following abnormal movements were seen: L>R resting and postural tremor present in BUE. L>R and UE>LE bradykinesia with repetitive movements.   Strength is 5/5 throughout all four extremities.    Sensory  Light touch is normal in upper and lower extremities.     Coordination  Right: Finger-to-nose normal. Heel-to-shin normal.Left: Finger-to-nose normal. Heel-to-shin normal.    Gait  Casual gait: Normal stance. Normal stride length. Reduced right arm swing. Reduced left arm swing.Normal toe walking. Normal heel walking. Normal tandem gait. Romberg is absent. Normal pull test. Able to rise from chair without using arms.      Physical Exam  Constitutional:       Appearance: Normal appearance.   HENT:      Head: Normocephalic and atraumatic.      Mouth/Throat:      Mouth: Mucous membranes are moist.   Eyes:      Extraocular Movements: Extraocular movements intact.      Pupils: Pupils are equal, round, and reactive to light.   Cardiovascular:      Rate and Rhythm: Normal rate.   Pulmonary:      Effort: Pulmonary effort is normal.   Neurological:      Mental Status: He is alert.      Motor: Motor strength is normal.     Coordination: Romberg sign negative.   Psychiatric:         Speech: Speech normal.         MDS UPDRS Score:  Motor Examination  Is the  patient on medication for treating the symptoms of Parkinson's Disease?: No  Is the patient on Levodopa?: No  Speech: 0  Facial Expression: 2  Rigidty Neck: 0  Rigidty RUE: 2  Rigidity - LUE: 2  Rigidity RLE: 0  Rigidity LLE: 0  Finger Tapping Right Hand: 2  Finger Tapping Left Hand: 2  Hand Movements- Right Hand: 1  Hand Movements- Left Hand: 2  Pronatiaon-Supination Movments - Right Hand: 1  Pronatiaon-Supination Movments Left Hand: 2  Toe Tapping Right Foot: 1  Toe Tapping - Left Foot: 2  Leg Agility - Right Le  Leg Agility - Left le  Arising from Chair: 0  Gait: 1  Freezing of Gait: 0  Postural Stability: 0  Posture: 0  Global Spontanteity of Movment ( Body Bradykinesia): 2  Postural Tremor - Right Hand: 1  Postural Tremor - Left hand: 1  Kinetic Tremor - Right hand: 0  Kinetic Tremor - Left hand: 1  Rest Tremor Amplitude - RUE: 1  Rest Tremor Amplitude - LUE: 2  Rest Tremor Amplitude - RLE: 0  Rest Tremor Amplitude - LLE: 0  Rest Tremor Amplitude - Lip/Jaw: 1  Constancy of Rest Tremor: 3  MDS UPDRS Total Score: 34  Were dyskinesias (chorea or dystonia) present during examination?: No  Hoen and Yahr Stage: 2                         Assessment/Plan  Mino Fernandez is a right handed  68 y.o. year old male who presents with Parkinson's disease, initially diagnosed 2016. Appears to be tremor predominant subtype with relatively mild progression over past 7 years. Discussed potentially starting medication, but given lack of significant impact on functioning, he would like to hold off at this time and pursue exercise as treatment. Provided information regarding [employer] and [employer] programs. Discussed participation in some studies, which he was agreeable to. Plan to re-evaluate in 6 months to monitor symptom progression.    - Information on exercise programs provided  - Defer medication at this time  - RTC 6 months or sooner ABDULKADIR Noble MD  Neurology PGY-2    Patient was discussed, seen, and  evaluated with attending physician Dr. Vance.      I saw and evaluated the patient. I personally obtained the key and critical portions of the history and physical exam or was physically present for key and critical portions performed by the resident/fellow. I reviewed the resident/fellow's documentation and discussed the patient with the resident/fellow. I agree with the resident/fellow's medical decision making as documented in the note.    Pleasant 67yo M with Parkinson's disease.  He has rather classic and unequivocal symptoms, untreated.  Offered levodopa, given the continuous moderate rest tremor.  Also offered rasagiline or amantadine.  He prefers not to treat at this time.  Discussed exercise and also research studies.  He had interest in our saliva biomarker study and also the PD-GENEration genetic testing study.      Jesus Vance MD

## 2023-11-28 NOTE — Clinical Note
November 28, 2023       No Recipients    Patient: Mino Fernandez   YOB: 1955   Date of Visit: 11/28/2023       Dear Dr. Srinivasan Recipients:    Thank you for referring Mino Fernandez to me for evaluation. Below are my notes for this consultation.  If you have questions, please do not hesitate to call me. I look forward to following your patient along with you.       Sincerely,     Jesus Vance MD      CC:   No Recipients  ______________________________________________________________________________________    Subjective    Mino Fernandez is a right handed  68 y.o. year old male who presents with Parkinson's disease.    HPI  Started experiencing L hand tremor 2016. Now has intermittent tremor in right hand for past year. Has been using PT to manage and has not tried medications before. Does note that he had some leg hesitation that improved with dance exercise. No significant impact on activity. Has some increased difficulty with typing, but notes no other significant functional impairment. Notes its worse with cold and anxiety. Usually steady when performing tasks, but notices it when he rests. Denies any history of abnormal sleep behavior. No bed partner, but sleep mar notes no significant movement. No vivid dreams. No anosmia. Memory has been doing well. Does note some delay in recall.  No reported falls.    Was previously dx as Parkinson's disease in 2016 by Dr. Vance based on mild LUE tremor. No head imaging since onset of symptoms    PMH: Asthma, white-coat hypertension, glaucoma    Social Hx: Does not drink. Never smoker. No illicit substance use     Family Hx: 1st cousin w/ Parkinson's (possibly related to agent orange exposure)    Allergies: alcohol, pollen    Patient Health Questionnaire-2 Score: 0          Review of Systems  All other system have been reviewed and are negative for complaint.  Objective  Neurological Exam  Mental Status  Alert. Speech is normal. Language is fluent with no  aphasia. Attention and concentration are normal.    Cranial Nerves  CN II: Visual fields full to confrontation.  CN III, IV, VI: Extraocular movements intact bilaterally. Pupils equal round and reactive to light bilaterally.  CN V: Facial sensation is normal.  CN VII: Full and symmetric facial movement.  CN VIII: Hearing is normal.  CN IX, X: Palate elevates symmetrically  CN XI: Shoulder shrug strength is normal.  CN XII: Tongue midline without atrophy or fasciculations. Tremor present.    Motor  Normal muscle bulk throughout. No fasciculations present. Increased muscle tone. L>R cogwheel rigidity in upper extremities. The following abnormal movements were seen: L>R resting and postural tremor present in BUE. L>R and UE>LE bradykinesia with repetitive movements.   Strength is 5/5 throughout all four extremities.    Sensory  Light touch is normal in upper and lower extremities.     Coordination  Right: Finger-to-nose normal. Heel-to-shin normal.Left: Finger-to-nose normal. Heel-to-shin normal.    Gait  Casual gait: Normal stance. Normal stride length. Reduced right arm swing. Reduced left arm swing.Normal toe walking. Normal heel walking. Normal tandem gait. Romberg is absent. Normal pull test. Able to rise from chair without using arms.      Physical Exam  Constitutional:       Appearance: Normal appearance.   HENT:      Head: Normocephalic and atraumatic.      Mouth/Throat:      Mouth: Mucous membranes are moist.   Eyes:      Extraocular Movements: Extraocular movements intact.      Pupils: Pupils are equal, round, and reactive to light.   Cardiovascular:      Rate and Rhythm: Normal rate.   Pulmonary:      Effort: Pulmonary effort is normal.   Neurological:      Mental Status: He is alert.      Motor: Motor strength is normal.     Coordination: Romberg sign negative.   Psychiatric:         Speech: Speech normal.         MDS UPDRS Score:  Motor Examination  Is the patient on medication for treating the symptoms of  Parkinson's Disease?: No  Is the patient on Levodopa?: No  Speech: 0  Facial Expression: 2  Rigidty Neck: 0  Rigidty RUE: 2  Rigidity - LUE: 2  Rigidity RLE: 0  Rigidity LLE: 0  Finger Tapping Right Hand: 2  Finger Tapping Left Hand: 2  Hand Movements- Right Hand: 1  Hand Movements- Left Hand: 2  Pronatiaon-Supination Movments - Right Hand: 1  Pronatiaon-Supination Movments Left Hand: 2  Toe Tapping Right Foot: 1  Toe Tapping - Left Foot: 2  Leg Agility - Right Le  Leg Agility - Left le  Arising from Chair: 0  Gait: 1  Freezing of Gait: 0  Postural Stability: 0  Posture: 0  Global Spontanteity of Movment ( Body Bradykinesia): 2  Postural Tremor - Right Hand: 1  Postural Tremor - Left hand: 1  Kinetic Tremor - Right hand: 0  Kinetic Tremor - Left hand: 1  Rest Tremor Amplitude - RUE: 1  Rest Tremor Amplitude - LUE: 2  Rest Tremor Amplitude - RLE: 0  Rest Tremor Amplitude - LLE: 0  Rest Tremor Amplitude - Lip/Jaw: 1  Constancy of Rest Tremor: 3  MDS UPDRS Total Score: 34  Were dyskinesias (chorea or dystonia) present during examination?: No  Hoen and Yahr Stage: 2                         Assessment/Plan  Mino Fernandez is a right handed  68 y.o. year old male who presents with Parkinson's disease, initially diagnosed 2016. Appears to be tremor predominant subtype with relatively mild progression over past 7 years. Discussed potentially starting medication, but given lack of significant impact on functioning, he would like to hold off at this time and pursue exercise as treatment. Provided information regarding [employer] programs. Discussed participation in some studies, which he was agreeable to. Plan to re-evaluate in 6 months to monitor symptom progression.    - Information on exercise programs provided  - Defer medication at this time  - RTC 6 months or sooner ABDULKADIR Noble MD  Neurology PGY-2    Patient was discussed, seen, and evaluated with attending physician   Rajiv.      I saw and evaluated the patient. I personally obtained the key and critical portions of the history and physical exam or was physically present for key and critical portions performed by the resident/fellow. I reviewed the resident/fellow's documentation and discussed the patient with the resident/fellow. I agree with the resident/fellow's medical decision making as documented in the note.    Pleasant 69yo M with Parkinson's disease.  He has rather classic and unequivocal symptoms, untreated.  Offered levodopa, given the continuous moderate rest tremor.  Also offered rasagiline or amantadine.  He prefers not to treat at this time.  Discussed exercise and also research studies.  He had interest in our saliva biomarker study and also the PD-GENEration genetic testing study.      Jesus Vance MD

## 2023-11-29 ENCOUNTER — OFFICE VISIT (OUTPATIENT)
Dept: UROLOGY | Facility: CLINIC | Age: 68
End: 2023-11-29
Payer: MEDICARE

## 2023-11-29 VITALS
HEART RATE: 79 BPM | HEIGHT: 71 IN | DIASTOLIC BLOOD PRESSURE: 76 MMHG | WEIGHT: 151.2 LBS | BODY MASS INDEX: 21.17 KG/M2 | SYSTOLIC BLOOD PRESSURE: 124 MMHG | TEMPERATURE: 97 F

## 2023-11-29 DIAGNOSIS — R97.20 ELEVATED PSA: Primary | ICD-10-CM

## 2023-11-29 PROCEDURE — 1159F MED LIST DOCD IN RCRD: CPT | Performed by: STUDENT IN AN ORGANIZED HEALTH CARE EDUCATION/TRAINING PROGRAM

## 2023-11-29 PROCEDURE — 99213 OFFICE O/P EST LOW 20 MIN: CPT | Performed by: STUDENT IN AN ORGANIZED HEALTH CARE EDUCATION/TRAINING PROGRAM

## 2023-11-29 PROCEDURE — 3078F DIAST BP <80 MM HG: CPT | Performed by: STUDENT IN AN ORGANIZED HEALTH CARE EDUCATION/TRAINING PROGRAM

## 2023-11-29 PROCEDURE — 3074F SYST BP LT 130 MM HG: CPT | Performed by: STUDENT IN AN ORGANIZED HEALTH CARE EDUCATION/TRAINING PROGRAM

## 2023-11-29 PROCEDURE — 1036F TOBACCO NON-USER: CPT | Performed by: STUDENT IN AN ORGANIZED HEALTH CARE EDUCATION/TRAINING PROGRAM

## 2023-11-29 PROCEDURE — 1160F RVW MEDS BY RX/DR IN RCRD: CPT | Performed by: STUDENT IN AN ORGANIZED HEALTH CARE EDUCATION/TRAINING PROGRAM

## 2023-11-29 NOTE — PROGRESS NOTES
Mino presents for a follow up visit.  The patient’s EMR has been reviewed.  Lives in [place], [place].  Referred by Dr. Bermudez, PCP.     H/o elevated PSA, erectile dysfunction, parkinson's disease, glaucoma  Upward trending PSA since Jan 2022.  Last being 5.04 (Sept 2023).  Previous 4.87 (2.65 > 3.19 > 4.87).   No prior biopsies, urinary retention,  injury or infections.   FHx of prostate cancer (Uncle).    SUBJECTIVE: HPI   TODAY (11/29/23)  Presents today to review MRI results.   Prostate MRI (10/11/23) showed:  46.4g prostate with PI-RADS 3 and 4 lesions.  Findings reviewed with patient.     Otherwise, no acute or worsening complaints.     TO REVIEW: Last evaluation 06/28/23)  He reports he is doing well overall.  Denies any acute or worsening urinary issues.  Here today to review results of his repeat PSA.     PSA from 06/20/23:  - Total (4.7)  - Free (21%)  - This places him at about a 25% chance of malignancy.  - Findings shared with patient.      TO REVIEW: Initial evaluation (05/10/23)  Patient denies any bothersome obstructive urinary symptoms at this time.   Reports an occasional weak stream at night, however, this is not bothersome. Denies any dysuria, gross hematuria, flank pain, incomplete bladder emptying, or incontinence.     History of erectile dysfunction, however he is not seeking or interested in medical treatment for this.      Had his PSA drawn yesterday but it has not resulted yet (06/20/23)     Additional history of asthma.  PSHx of hernia repair and knee surgery.  Non-smoker.    Past medical, surgical, family and social history in the chart was reviewed and is accurate including any additions to what is in this HPI.    Review of Systems   Constitutional: denies any unintentional weight loss or change in strength.  Integumentary: denies any rashes or pruritus.  Eyes: denies any double vision or eye pain.  Ear/Nose/Mouth/Throat: denies any nosebleeds or gum bleeds.  Cardiovascular: denies  any chest pain or syncope.  Respiratory: denies hemoptysis.  Gastrointestinal: denies nausea or vomiting.  Musculoskeletal: denies muscle cramping or weakness.  Neurologic: denies convulsions or seizures.  Hematologic/Lymphatic: denies bleeding tendencies.  Endocrine: denies heat/cold intolerance.  All other systems have been reviewed and are negative unless otherwise noted in the HPI.    OBJECTIVE:  Visit Vitals  /76   Pulse 79   Temp 36.1 °C (97 °F)     Physical Exam   Constitutional: No obvious distress.  Eyes: Non-injected conjunctiva, sclera clear, EOMI.  Ears/Nose/Mouth/Throat: No obvious drainage per ears or nose.  Cardiovascular: Extremities are warm and well perfused. No edema, cyanosis or pallor.  Respiratory: No audible wheezing/stridor; respirations do not appear labored.  Gastrointestinal: Abdomen soft, not distended.  Musculoskeletal: Normal ROM of extremities.  Skin: No obvious rashes or open sores.  Neurologic: Alert and oriented, CN 2-12 grossly intact.  Psychiatric: Answers questions appropriately with normal affect.  Hematologic/Lymphatic/Immunologic: No obvious bruises or sites of spontaneous bleeding.  Genitourinary: No CVA tenderness, bladder not palpable.     Labs and imaging:  Lab Results   Component Value Date    WBC 5.0 09/08/2023    HGB 13.8 09/08/2023    HCT 42.0 09/08/2023     09/08/2023    CHOL 145 09/08/2023    TRIG 47 09/08/2023    HDL 72.0 09/08/2023    ALT 15 09/08/2023    AST 16 09/08/2023     09/08/2023    K 4.0 09/08/2023     09/08/2023    CREATININE 0.85 09/08/2023    BUN 26 (H) 09/08/2023    CO2 31 09/08/2023    TSH 4.24 (H) 09/08/2023    PSA 5.04 (H) 09/08/2023     ASSESSMENT:  Problem List Items Addressed This Visit       Elevated PSA - Primary    Relevant Orders    Follow Up In Urology      PLAN:  I recommend fusion guided prostate biopsy. We will refer him to Dr. Sanchez. Risks discussed.     All questions were answered to the patient’s  satisfaction.  Patient agrees with the plan and wishes to proceed.  Follow-up will be scheduled appropriately.     Scribed for Dr. Artie Nelson by Cali Marsh.  I, Dr. Artie Nelson have personally reviewed and agreed with the information entered by the Virtual Scribe. 11/29/23.

## 2023-12-08 ENCOUNTER — OFFICE VISIT (OUTPATIENT)
Dept: GASTROENTEROLOGY | Facility: EXTERNAL LOCATION | Age: 68
End: 2023-12-08
Payer: MEDICARE

## 2023-12-08 DIAGNOSIS — Z86.010 PERSONAL HISTORY OF COLONIC POLYPS: Primary | ICD-10-CM

## 2023-12-08 DIAGNOSIS — Z12.11 SCREENING FOR COLON CANCER: ICD-10-CM

## 2023-12-08 PROCEDURE — 1036F TOBACCO NON-USER: CPT | Performed by: INTERNAL MEDICINE

## 2023-12-08 PROCEDURE — 1160F RVW MEDS BY RX/DR IN RCRD: CPT | Performed by: INTERNAL MEDICINE

## 2023-12-08 PROCEDURE — 1159F MED LIST DOCD IN RCRD: CPT | Performed by: INTERNAL MEDICINE

## 2023-12-08 PROCEDURE — G0105 COLORECTAL SCRN; HI RISK IND: HCPCS | Performed by: INTERNAL MEDICINE

## 2024-02-05 ENCOUNTER — OFFICE VISIT (OUTPATIENT)
Dept: UROLOGY | Facility: CLINIC | Age: 69
End: 2024-02-05
Payer: MEDICARE

## 2024-02-05 VITALS
HEART RATE: 81 BPM | BODY MASS INDEX: 21.06 KG/M2 | SYSTOLIC BLOOD PRESSURE: 129 MMHG | TEMPERATURE: 96.6 F | DIASTOLIC BLOOD PRESSURE: 76 MMHG | WEIGHT: 151 LBS

## 2024-02-05 DIAGNOSIS — R97.20 ELEVATED PSA: Primary | ICD-10-CM

## 2024-02-05 DIAGNOSIS — R35.0 FREQUENCY OF URINATION: ICD-10-CM

## 2024-02-05 PROCEDURE — 3078F DIAST BP <80 MM HG: CPT | Performed by: UROLOGY

## 2024-02-05 PROCEDURE — 3074F SYST BP LT 130 MM HG: CPT | Performed by: UROLOGY

## 2024-02-05 PROCEDURE — 1159F MED LIST DOCD IN RCRD: CPT | Performed by: UROLOGY

## 2024-02-05 PROCEDURE — 99203 OFFICE O/P NEW LOW 30 MIN: CPT | Performed by: UROLOGY

## 2024-02-05 PROCEDURE — 1036F TOBACCO NON-USER: CPT | Performed by: UROLOGY

## 2024-02-05 PROCEDURE — 1160F RVW MEDS BY RX/DR IN RCRD: CPT | Performed by: UROLOGY

## 2024-02-05 NOTE — PROGRESS NOTES
History Of Present Illness  68-year-old gentleman seeing me for consultation for possible prostate biopsy  PMH: Parkinson's, glaucoma, erectile dysfunction  Uncle with prostate cancer, no first-order relatives with prostate cancer or breast cancer    PSA history:  9/8/2023: 5.04  02/23: 4.87  08/22: 3.19  01/22: 2.65  07/21: 3.07  07/20: 3.52  08/19: 2.48    MRI prostate 10/11/2023: 46.4 g prostate, PSA density 0.11, PI-RADS 4 lesion anterior transition zone, PI-RADS 3 right apical transition zone    IPSS 9  IRENE 1    Past Medical History  He has a past medical history of Other specified health status.    Surgical History  He has a past surgical history that includes Colonoscopy (09/24/2013); Knee surgery (09/24/2013); Hernia repair (09/24/2013); Other surgical history (07/15/2020); and Colonoscopy (09/2020).     Social History  He reports that he has never smoked. He has never used smokeless tobacco. He reports that he does not drink alcohol and does not use drugs.    Family History  Family History   Problem Relation Name Age of Onset    Lung cancer Mother      Stroke Father          Allergies  Pollen extracts    ROS: 12 system review was completed and is negative with the exception of those signs and symptoms noted in the history of present illness: A 12 system review was completed and is negative with the exception of those signs and symptoms noted in the history of present illness.     Exam:  General: in NAD, appears stated age  Head: normocephalic, atraumatic  Respiratory: normal effort, no use of accessory muscles  Cardiovascular: no edema noted  Skin: normal turgor, no rashes  Neurologic: grossly intact, oriented to person/place/time  Psychiatric: mode and affect appropriate     Last Recorded Vitals  There were no vitals taken for this visit.    Lab Results   Component Value Date    PSASCREEN 4.87 (H) 02/08/2023    CREATININE 0.85 09/08/2023    HGB 13.8 09/08/2023         ASSESSMENT/PLAN:  # Elevated PSA,  concerning MRI of the prostate  -Based on his prostate MRI recommended proceeding with transperineal fusion biopsy  -He understands will be conservative in our treatment recommendations given his Parkinson's diagnosis, likely will only treat intermediate risk unfavorable prostate cancer and above  -I described the procedure and recovery process today    Artie Neely MD

## 2024-03-12 ENCOUNTER — OFFICE VISIT (OUTPATIENT)
Dept: PRIMARY CARE | Facility: CLINIC | Age: 69
End: 2024-03-12
Payer: MEDICARE

## 2024-03-12 ENCOUNTER — LAB (OUTPATIENT)
Dept: LAB | Facility: LAB | Age: 69
End: 2024-03-12
Payer: MEDICARE

## 2024-03-12 VITALS
HEIGHT: 71 IN | BODY MASS INDEX: 20.61 KG/M2 | HEART RATE: 88 BPM | WEIGHT: 147.2 LBS | SYSTOLIC BLOOD PRESSURE: 120 MMHG | DIASTOLIC BLOOD PRESSURE: 74 MMHG

## 2024-03-12 DIAGNOSIS — R97.20 ELEVATED PSA: ICD-10-CM

## 2024-03-12 DIAGNOSIS — R53.81 MALAISE: Primary | ICD-10-CM

## 2024-03-12 DIAGNOSIS — E78.2 HYPERLIPEMIA, MIXED: ICD-10-CM

## 2024-03-12 DIAGNOSIS — R53.81 MALAISE: ICD-10-CM

## 2024-03-12 DIAGNOSIS — J45.20 MILD INTERMITTENT ASTHMA, UNSPECIFIED WHETHER COMPLICATED (HHS-HCC): ICD-10-CM

## 2024-03-12 DIAGNOSIS — E46 PROTEIN-CALORIE MALNUTRITION, UNSPECIFIED SEVERITY (MULTI): ICD-10-CM

## 2024-03-12 DIAGNOSIS — I10 WHITE COAT SYNDROME WITH HYPERTENSION: ICD-10-CM

## 2024-03-12 LAB — TSH SERPL-ACNC: 2.48 MIU/L (ref 0.44–3.98)

## 2024-03-12 PROCEDURE — 3078F DIAST BP <80 MM HG: CPT | Performed by: INTERNAL MEDICINE

## 2024-03-12 PROCEDURE — 3074F SYST BP LT 130 MM HG: CPT | Performed by: INTERNAL MEDICINE

## 2024-03-12 PROCEDURE — 1036F TOBACCO NON-USER: CPT | Performed by: INTERNAL MEDICINE

## 2024-03-12 PROCEDURE — 1159F MED LIST DOCD IN RCRD: CPT | Performed by: INTERNAL MEDICINE

## 2024-03-12 PROCEDURE — G0439 PPPS, SUBSEQ VISIT: HCPCS | Performed by: INTERNAL MEDICINE

## 2024-03-12 PROCEDURE — 1124F ACP DISCUSS-NO DSCNMKR DOCD: CPT | Performed by: INTERNAL MEDICINE

## 2024-03-12 PROCEDURE — 1160F RVW MEDS BY RX/DR IN RCRD: CPT | Performed by: INTERNAL MEDICINE

## 2024-03-12 PROCEDURE — 84443 ASSAY THYROID STIM HORMONE: CPT

## 2024-03-12 PROCEDURE — 99214 OFFICE O/P EST MOD 30 MIN: CPT | Performed by: INTERNAL MEDICINE

## 2024-03-12 PROCEDURE — 36415 COLL VENOUS BLD VENIPUNCTURE: CPT

## 2024-03-12 PROCEDURE — 1126F AMNT PAIN NOTED NONE PRSNT: CPT | Performed by: INTERNAL MEDICINE

## 2024-03-12 PROCEDURE — 1170F FXNL STATUS ASSESSED: CPT | Performed by: INTERNAL MEDICINE

## 2024-03-12 ASSESSMENT — ENCOUNTER SYMPTOMS
ACTIVITY CHANGE: 0
STRIDOR: 0
NUMBNESS: 0
APPETITE CHANGE: 0
WEAKNESS: 0
SORE THROAT: 0
EYE REDNESS: 0
NAUSEA: 0
HEADACHES: 0
TROUBLE SWALLOWING: 0
VOICE CHANGE: 0
WHEEZING: 0
SEIZURES: 0
MYALGIAS: 0
DIARRHEA: 0
FREQUENCY: 0
VOMITING: 0
RHINORRHEA: 0
SHORTNESS OF BREATH: 0
SINUS PRESSURE: 0
TREMORS: 1
POLYDIPSIA: 0
EYE DISCHARGE: 0
CHEST TIGHTNESS: 0
FACIAL ASYMMETRY: 0
CHILLS: 0
FATIGUE: 0
SPEECH DIFFICULTY: 0
DYSURIA: 0
JOINT SWELLING: 0
LIGHT-HEADEDNESS: 0
NECK STIFFNESS: 0
CONSTIPATION: 0
HEMATURIA: 0
PHOTOPHOBIA: 0
SINUS PAIN: 0
SLEEP DISTURBANCE: 0
EYE PAIN: 0
ARTHRALGIAS: 0
WOUND: 0
FACIAL SWELLING: 0
COUGH: 0
ABDOMINAL PAIN: 0
POLYPHAGIA: 0
PALPITATIONS: 0
BLOOD IN STOOL: 0
RECTAL PAIN: 0
DIZZINESS: 0
ANAL BLEEDING: 0
BRUISES/BLEEDS EASILY: 0
CHOKING: 0
EYE ITCHING: 0
COLOR CHANGE: 0
BACK PAIN: 0
DIFFICULTY URINATING: 0
FLANK PAIN: 0
DIAPHORESIS: 0
ABDOMINAL DISTENTION: 0
NECK PAIN: 0
ADENOPATHY: 0

## 2024-03-12 ASSESSMENT — ACTIVITIES OF DAILY LIVING (ADL)
TAKING_MEDICATION: INDEPENDENT
DOING_HOUSEWORK: INDEPENDENT
BATHING: INDEPENDENT
GROCERY_SHOPPING: INDEPENDENT
MANAGING_FINANCES: INDEPENDENT
DRESSING: INDEPENDENT

## 2024-03-12 ASSESSMENT — PATIENT HEALTH QUESTIONNAIRE - PHQ9
2. FEELING DOWN, DEPRESSED OR HOPELESS: NOT AT ALL
1. LITTLE INTEREST OR PLEASURE IN DOING THINGS: NOT AT ALL
SUM OF ALL RESPONSES TO PHQ9 QUESTIONS 1 AND 2: 0

## 2024-03-12 ASSESSMENT — PAIN SCALES - GENERAL: PAINLEVEL: 0-NO PAIN

## 2024-03-12 NOTE — PATIENT INSTRUCTIONS
Please take medication as prescribed.  Diet and exercise.  Follow-up in 6 months.  Obtain a blood test.

## 2024-03-12 NOTE — PROGRESS NOTES
Subjective   Patient ID: Mino Fernandez is a 68 y.o. male who presents for Follow-up and Medicare Annual Wellness Visit Subsequent.    Patient presents for wellness exam and follow-up.  He has been compliant with his medications, diet and exercise.  He reports that his Parkinson symptoms made slightly worse.  He otherwise was okay.  He denies any headaches, no dizziness, no sinus problems, no chest pain or shortness of breath.  He denies abdominal pain no nausea vomiting or diarrhea.  He reports no new musculoskeletal complaints.         Review of Systems   Constitutional:  Negative for activity change, appetite change, chills, diaphoresis and fatigue.   HENT:  Negative for congestion, dental problem, drooling, ear discharge, ear pain, facial swelling, hearing loss, mouth sores, nosebleeds, postnasal drip, rhinorrhea, sinus pressure, sinus pain, sneezing, sore throat, tinnitus, trouble swallowing and voice change.    Eyes:  Negative for photophobia, pain, discharge, redness, itching and visual disturbance.   Respiratory:  Negative for cough, choking, chest tightness, shortness of breath, wheezing and stridor.    Cardiovascular:  Negative for chest pain, palpitations and leg swelling.   Gastrointestinal:  Negative for abdominal distention, abdominal pain, anal bleeding, blood in stool, constipation, diarrhea, nausea, rectal pain and vomiting.   Endocrine: Negative for cold intolerance, heat intolerance, polydipsia, polyphagia and polyuria.   Genitourinary:  Negative for decreased urine volume, difficulty urinating, dysuria, enuresis, flank pain, frequency, genital sores, hematuria and urgency.   Musculoskeletal:  Negative for arthralgias, back pain, gait problem, joint swelling, myalgias, neck pain and neck stiffness.   Skin:  Negative for color change, pallor, rash and wound.   Neurological:  Positive for tremors. Negative for dizziness, seizures, syncope, facial asymmetry, speech difficulty, weakness,  "light-headedness, numbness and headaches.   Hematological:  Negative for adenopathy. Does not bruise/bleed easily.   Psychiatric/Behavioral:  Negative for sleep disturbance.        Objective   /74   Pulse 88   Ht 1.803 m (5' 11\")   Wt 66.8 kg (147 lb 3.2 oz)   BMI 20.53 kg/m²     Physical Exam  Constitutional:       Appearance: Normal appearance.   Cardiovascular:      Rate and Rhythm: Normal rate and regular rhythm.      Heart sounds: No murmur heard.     No gallop.   Pulmonary:      Effort: No respiratory distress.      Breath sounds: No wheezing or rales.   Abdominal:      General: There is no distension.      Palpations: There is no mass.      Tenderness: There is no abdominal tenderness. There is no guarding.   Musculoskeletal:      Right lower leg: No edema.      Left lower leg: No edema.   Neurological:      Mental Status: He is alert.      Comments: Resting tremor present.         Assessment/Plan   Diagnoses and all orders for this visit:  Malaise  -     TSH with reflex to Free T4 if abnormal; Future  Protein-calorie malnutrition, unspecified severity (CMS/HCC)-encouraged increase p.o. intake.  Hyperlipemia, mixed-we will continue with diet and exercise  White coat syndrome with hypertension-blood pressure is improved.  Mild intermittent asthma, unspecified whether complicated-he denies any symptoms.  Parkinson disease-he will follow-up with neurology as scheduled.  Elevated PSA-he is scheduled for prostate biopsy.  Health maintenance-colonoscopy has been done.  He will get a tetanus shot at the pharmacy.  Eye and dental have been done.  Dermatology appointment has been advance care planning discussed.  Elevated TSH-we will recheck today.       "

## 2024-04-09 ENCOUNTER — ANESTHESIA EVENT (OUTPATIENT)
Dept: OPERATING ROOM | Facility: CLINIC | Age: 69
End: 2024-04-09
Payer: MEDICARE

## 2024-04-09 ENCOUNTER — ANESTHESIA (OUTPATIENT)
Dept: OPERATING ROOM | Facility: CLINIC | Age: 69
End: 2024-04-09
Payer: MEDICARE

## 2024-04-09 ENCOUNTER — HOSPITAL ENCOUNTER (OUTPATIENT)
Facility: CLINIC | Age: 69
Setting detail: OUTPATIENT SURGERY
Discharge: HOME | End: 2024-04-09
Attending: UROLOGY | Admitting: UROLOGY
Payer: MEDICARE

## 2024-04-09 VITALS
TEMPERATURE: 96.8 F | WEIGHT: 148.59 LBS | RESPIRATION RATE: 16 BRPM | SYSTOLIC BLOOD PRESSURE: 123 MMHG | DIASTOLIC BLOOD PRESSURE: 70 MMHG | OXYGEN SATURATION: 99 % | HEART RATE: 86 BPM | HEIGHT: 71 IN | BODY MASS INDEX: 20.8 KG/M2

## 2024-04-09 DIAGNOSIS — R97.20 ELEVATED PSA: ICD-10-CM

## 2024-04-09 PROBLEM — G70.9 NEUROMUSCULAR DISEASE (MULTI): Status: ACTIVE | Noted: 2024-04-09

## 2024-04-09 PROCEDURE — 2720000007 HC OR 272 NO HCPCS: Performed by: UROLOGY

## 2024-04-09 PROCEDURE — G0416 PROSTATE BIOPSY, ANY MTHD: HCPCS | Mod: TC,SUR | Performed by: UROLOGY

## 2024-04-09 PROCEDURE — G0416 PROSTATE BIOPSY, ANY MTHD: HCPCS | Performed by: PATHOLOGY

## 2024-04-09 PROCEDURE — 7100000009 HC PHASE TWO TIME - INITIAL BASE CHARGE: Performed by: UROLOGY

## 2024-04-09 PROCEDURE — 2500000004 HC RX 250 GENERAL PHARMACY W/ HCPCS (ALT 636 FOR OP/ED)

## 2024-04-09 PROCEDURE — 3700000002 HC GENERAL ANESTHESIA TIME - EACH INCREMENTAL 1 MINUTE: Performed by: UROLOGY

## 2024-04-09 PROCEDURE — 3600000004 HC OR TIME - INITIAL BASE CHARGE - PROCEDURE LEVEL FOUR: Performed by: UROLOGY

## 2024-04-09 PROCEDURE — 7100000010 HC PHASE TWO TIME - EACH INCREMENTAL 1 MINUTE: Performed by: UROLOGY

## 2024-04-09 PROCEDURE — 76942 ECHO GUIDE FOR BIOPSY: CPT | Performed by: UROLOGY

## 2024-04-09 PROCEDURE — 2500000005 HC RX 250 GENERAL PHARMACY W/O HCPCS: Performed by: UROLOGY

## 2024-04-09 PROCEDURE — C1819 TISSUE LOCALIZATION-EXCISION: HCPCS | Performed by: UROLOGY

## 2024-04-09 PROCEDURE — 3600000009 HC OR TIME - EACH INCREMENTAL 1 MINUTE - PROCEDURE LEVEL FOUR: Performed by: UROLOGY

## 2024-04-09 PROCEDURE — 55700 PR PROSTATE NEEDLE BIOPSY ANY APPROACH: CPT | Performed by: UROLOGY

## 2024-04-09 PROCEDURE — A55700 PR BIOPSY OF PROSTATE,NEEDLE/PUNCH

## 2024-04-09 PROCEDURE — A55700 PR BIOPSY OF PROSTATE,NEEDLE/PUNCH: Performed by: ANESTHESIOLOGY

## 2024-04-09 PROCEDURE — 3700000001 HC GENERAL ANESTHESIA TIME - INITIAL BASE CHARGE: Performed by: UROLOGY

## 2024-04-09 RX ORDER — SODIUM CHLORIDE, SODIUM LACTATE, POTASSIUM CHLORIDE, CALCIUM CHLORIDE 600; 310; 30; 20 MG/100ML; MG/100ML; MG/100ML; MG/100ML
100 INJECTION, SOLUTION INTRAVENOUS CONTINUOUS
Status: CANCELLED | OUTPATIENT
Start: 2024-04-09

## 2024-04-09 RX ORDER — SODIUM CHLORIDE, SODIUM LACTATE, POTASSIUM CHLORIDE, CALCIUM CHLORIDE 600; 310; 30; 20 MG/100ML; MG/100ML; MG/100ML; MG/100ML
INJECTION, SOLUTION INTRAVENOUS CONTINUOUS PRN
Status: DISCONTINUED | OUTPATIENT
Start: 2024-04-09 | End: 2024-04-09

## 2024-04-09 RX ORDER — CEFAZOLIN 1 G/1
INJECTION, POWDER, FOR SOLUTION INTRAVENOUS AS NEEDED
Status: DISCONTINUED | OUTPATIENT
Start: 2024-04-09 | End: 2024-04-09

## 2024-04-09 RX ORDER — PROPOFOL 10 MG/ML
INJECTION, EMULSION INTRAVENOUS CONTINUOUS PRN
Status: DISCONTINUED | OUTPATIENT
Start: 2024-04-09 | End: 2024-04-09

## 2024-04-09 RX ORDER — BUPIVACAINE HYDROCHLORIDE 7.5 MG/ML
INJECTION, SOLUTION EPIDURAL; RETROBULBAR AS NEEDED
Status: DISCONTINUED | OUTPATIENT
Start: 2024-04-09 | End: 2024-04-09 | Stop reason: HOSPADM

## 2024-04-09 RX ORDER — ACETAMINOPHEN 325 MG/1
650 TABLET ORAL EVERY 4 HOURS PRN
Status: CANCELLED | OUTPATIENT
Start: 2024-04-09

## 2024-04-09 RX ORDER — LIDOCAINE IN NACL,ISO-OSMOT/PF 30 MG/3 ML
0.1 SYRINGE (ML) INJECTION ONCE
Status: CANCELLED | OUTPATIENT
Start: 2024-04-09 | End: 2024-04-09

## 2024-04-09 RX ORDER — PROPOFOL 10 MG/ML
INJECTION, EMULSION INTRAVENOUS AS NEEDED
Status: DISCONTINUED | OUTPATIENT
Start: 2024-04-09 | End: 2024-04-09

## 2024-04-09 RX ORDER — OXYCODONE HYDROCHLORIDE 5 MG/1
5 TABLET ORAL EVERY 4 HOURS PRN
Status: CANCELLED | OUTPATIENT
Start: 2024-04-09

## 2024-04-09 RX ORDER — ONDANSETRON HYDROCHLORIDE 2 MG/ML
4 INJECTION, SOLUTION INTRAVENOUS ONCE AS NEEDED
Status: CANCELLED | OUTPATIENT
Start: 2024-04-09

## 2024-04-09 RX ADMIN — PROPOFOL 200 MCG/KG/MIN: 10 INJECTION, EMULSION INTRAVENOUS at 11:04

## 2024-04-09 RX ADMIN — SODIUM CHLORIDE, POTASSIUM CHLORIDE, SODIUM LACTATE AND CALCIUM CHLORIDE: 600; 310; 30; 20 INJECTION, SOLUTION INTRAVENOUS at 11:00

## 2024-04-09 RX ADMIN — SODIUM CHLORIDE, POTASSIUM CHLORIDE, SODIUM LACTATE AND CALCIUM CHLORIDE: 600; 310; 30; 20 INJECTION, SOLUTION INTRAVENOUS at 11:15

## 2024-04-09 RX ADMIN — SODIUM CHLORIDE, POTASSIUM CHLORIDE, SODIUM LACTATE AND CALCIUM CHLORIDE: 600; 310; 30; 20 INJECTION, SOLUTION INTRAVENOUS at 12:05

## 2024-04-09 RX ADMIN — PROPOFOL 40 MG: 10 INJECTION, EMULSION INTRAVENOUS at 11:05

## 2024-04-09 RX ADMIN — CEFAZOLIN 2 G: 1 INJECTION, POWDER, FOR SOLUTION INTRAMUSCULAR; INTRAVENOUS at 11:08

## 2024-04-09 SDOH — HEALTH STABILITY: MENTAL HEALTH: CURRENT SMOKER: 0

## 2024-04-09 ASSESSMENT — COLUMBIA-SUICIDE SEVERITY RATING SCALE - C-SSRS
1. IN THE PAST MONTH, HAVE YOU WISHED YOU WERE DEAD OR WISHED YOU COULD GO TO SLEEP AND NOT WAKE UP?: NO
2. HAVE YOU ACTUALLY HAD ANY THOUGHTS OF KILLING YOURSELF?: NO
6. HAVE YOU EVER DONE ANYTHING, STARTED TO DO ANYTHING, OR PREPARED TO DO ANYTHING TO END YOUR LIFE?: NO

## 2024-04-09 ASSESSMENT — PAIN - FUNCTIONAL ASSESSMENT
PAIN_FUNCTIONAL_ASSESSMENT: 0-10

## 2024-04-09 ASSESSMENT — PAIN SCALES - GENERAL
PAINLEVEL_OUTOF10: 0 - NO PAIN
PAIN_LEVEL: 0
PAINLEVEL_OUTOF10: 0 - NO PAIN

## 2024-04-09 NOTE — ANESTHESIA POSTPROCEDURE EVALUATION
Patient: Mino Fernandez    Procedure Summary       Date: 04/09/24 [place] / [place] [facility] / [facility]    Anesthesia Start: 1101 Anesthesia Stop: 1133    Procedure: Transperineal Prostate Biopsy with URO-JOAQUIN Fusion (Prostate) Diagnosis:       Elevated PSA      (Elevated PSA [R97.20])    Surgeons: Artie Neely MD Responsible Provider: Marlon Edwards MD    Anesthesia Type: MAC ASA Status: 3            Anesthesia Type: No value filed.    Vitals Value Taken Time   /63 04/09/24 1132   Temp 36 °C (96.8 °F) 04/09/24 1132   Pulse 69 04/09/24 1132   Resp 16 04/09/24 1132   SpO2 100 % 04/09/24 1132       Anesthesia Post Evaluation    Patient location during evaluation: PACU  Patient participation: complete - patient participated  Level of consciousness: awake  Pain score: 0  Pain management: adequate  Airway patency: patent  Cardiovascular status: acceptable  Respiratory status: acceptable  Hydration status: acceptable  Postoperative Nausea and Vomiting: none    No notable events documented.

## 2024-04-09 NOTE — ANESTHESIA PREPROCEDURE EVALUATION
Patient: Mino Fernandez    Procedure Information       Anesthesia Start Date/Time: 04/09/24 1101    Procedure: Transperineal Prostate Biopsy with URO-JOAQUIN Fusion    Location: [facility] 03 / Virtual [facility]    Surgeons: Artie Neely MD            Relevant Problems   Anesthesia   (-) Difficult intubation   (-) Malignant hyperthermia      Cardiac   (+) Hyperlipemia, mixed   (+) White coat syndrome with hypertension      Pulmonary   (+) Asthma      Neuro   (+) Neuromuscular disease (CMS/HCC) (Parkinson's Disease)      GI   (+) Dysphagia   (+) Esophageal reflux      HEENT   (+) Glaucoma       Clinical information reviewed:   Tobacco  Allergies  Meds  Problems  Med Hx  Surg Hx   Fam Hx  Soc   Hx        NPO Detail:  NPO/Void Status  Date of Last Liquid: 04/09/24  Time of Last Liquid: 0000  Date of Last Solid: 04/09/24  Time of Last Solid: 0000         Physical Exam    Airway  Mallampati: III  TM distance: >3 FB     Cardiovascular - normal exam  Rhythm: regular  Rate: normal     Dental - normal exam     Pulmonary - normal exam     Abdominal - normal exam           Anesthesia Plan    History of general anesthesia?: yes  History of complications of general anesthesia?: no    ASA 3     MAC     The patient is not a current smoker.    intravenous induction   Anesthetic plan and risks discussed with patient.    Plan discussed with CRNA.

## 2024-04-09 NOTE — DISCHARGE INSTRUCTIONS
Post-Procedure Instructions for Transperineal Prostate Biopsy   Procedure Overview:   You have had a transperineal prostate biopsy.   We will call you to discuss your pathology results. Results typically take 7-10 business days. If you do not hear from us within 10 days, please reach out to us via the methods listed at the bottom of this handout.     What to Expect:   Blood-tinged urine with/without clots for 24-72 hours.   Blood in the ejaculate for 4-6 weeks.     When to Call:   Call your doctor immediately if you experience any of the following:    You are unable to urinate in 6-8 hours after the biopsy.    Fever above 101 degrees (F) or Chills    Passing excessive clots in urine or stool     Pain Control:   Tylenol should be adequate     General Instructions:    Resume normal diet.    If you take Aspirin, resume 3 days after biopsy if you do not see blood in your urine.    Drink 6-8 glasses of water the rest of the day to dilute the urine and to prevent clot formation.    No alcoholic beverages for 24 hours after your biopsy.    No straining or heavy lifting for 5 days after your biopsy.     DR. TARIQ'S CONTACT INFORMATION   For non-urgent issues, please send our office a message via [employer], this is often easier and more convenient for you than calling the office. You should have received an invitation to sign-up for [employer] in your email upon registration.   For appointments: [phone], option 1 -> option 3 -> option 9     For questions/issues/concerns during business hours: [phone] - this number will direct you to the voicemail for Dr. Tariq's , which is frequently checked during business hours.   F  or after-hours issues: [phone], this will direct you to our answering service or the resident physician on call if needed.   For medical emergencies, please call 911 or proceed to your nearest emergency room.

## 2024-04-09 NOTE — H&P
"History Of Present Illness  69yo m here for prostate biopsy with MRI fusion. No recent change in healrth.    Past Medical History  Past Medical History:   Diagnosis Date    Asthma     Glaucoma     Other specified health status     Known health problems: none        Surgical History  Past Surgical History:   Procedure Laterality Date    COLONOSCOPY  09/24/2013    Complete Colonoscopy    COLONOSCOPY  09/2023    rpt 9-28    HERNIA REPAIR  09/24/2013    Hernia Repair    KNEE SURGERY  09/24/2013    Knee Surgery    OTHER SURGICAL HISTORY  07/15/2020    Cataract surgery        Social History  He reports that he has never smoked. He has never used smokeless tobacco. He reports that he does not drink alcohol and does not use drugs.    Family History  Family History   Problem Relation Name Age of Onset    Lung cancer Mother      Stroke Father          Allergies  Pollen extracts    ROS: 12 system review was completed and is negative with the exception of those signs and symptoms noted in the history of present illness: A 12 system review was completed and is negative with the exception of those signs and symptoms noted in the history of present illness.     Exam:  General: in NAD, appears stated age  Head: normocephalic, atraumatic  Respiratory: normal effort, no use of accessory muscles  Cardiovascular: no edema noted  Skin: normal turgor, no rashes  Neurologic: grossly intact, oriented to person/place/time  Psychiatric: mode and affect appropriate    Per anesthesiology, clear to auscultation bilaterally with a regular rate and rhythm     Last Recorded Vitals  /70   Pulse 75   Temp 36.4 °C (97.5 °F) (Temporal)   Resp 16   Ht 1.803 m (5' 11\")   Wt 67.4 kg (148 lb 9.4 oz)   SpO2 98%   BMI 20.72 kg/m²       No results found for: \"URINECULTURE\", \"CREATININE\"      ASSESSMENT/PLAN:  Okay to proceed with prostate biopsy.     Artie Neely MD    "

## 2024-04-09 NOTE — OP NOTE
Transperineal Prostate Biopsy with URO-JOAQUNI Fusion Operative Note     Date: 2024  OR Location: [facility] OR    Name: Mino Fernandez, : 1955, Age: 68 y.o., MRN: [id number], Sex: male    Diagnosis  Pre-op Diagnosis     * Elevated PSA [R97.20] Post-op Diagnosis     * Elevated PSA [R97.20]     Procedures  Transperineal Prostate Biopsy with URO-JOAQUIN Fusion  41977 - ND PROSTATE NEEDLE BIOPSY ANY APPROACH      Surgeons      * Artie Neely - Primary    Resident/Fellow/Other Assistant:  Surgeon(s) and Role:    Procedure Summary  Anesthesia: Monitor Anesthesia Care  ASA: ASA status not filed in the log.  Anesthesia Staff: Anesthesiologist: Marlon Edwards MD  C-AA: MAX Shultz  Estimated Blood Loss: 5mL  Intra-op Medications:   Administrations occurring from 1050 to 1135 on 24:   Medication Name Total Dose   bupivacaine PF (Marcaine) injection 0.75 % 30 mL              Anesthesia Record               Intraprocedure I/O Totals          Intake    Propofol Drip 0.00 mL    The total shown is the total volume documented since Anesthesia Start was filed.    Total Intake 0 mL          Specimen:   ID Type Source Tests Collected by Time   1 : RIGHT ANTERIOR TARGET Tissue PROSTATE BIOPSY TARGETED DERICK SURGICAL PATHOLOGY EXAM Artie Neely MD 2024 1109   2 : RIGHT POSTERIOR MEDIAL Tissue PROSTATE NEEDLE BIOPSY RIGHT SURGICAL PATHOLOGY EXAM Silvino Covington RN 2024 1106   3 : RIGHT POSTERIOR LATERAL Tissue PROSTATE NEEDLE BIOPSY RIGHT SURGICAL PATHOLOGY EXAM Silvino Covington RN 2024 1106   4 : RIGHT BASE Tissue PROSTATE NEEDLE BIOPSY RIGHT SURGICAL PATHOLOGY EXAM Silvino Covington RN 2024 1106   5 : RIGHT ANTERIOR Tissue PROSTATE NEEDLE BIOPSY RIGHT SURGICAL PATHOLOGY EXAM Silvino Covington RN 2024 1106   6 : LEFT POSTERIOR MEDIAL Tissue PROSTATE NEEDLE BIOPSY LEFT SURGICAL PATHOLOGY EXAM Silvino Covington RN 2024 1106   7 : LEFT POSTERIOR LATERAL Tissue PROSTATE NEEDLE BIOPSY LEFT SURGICAL PATHOLOGY  EXAM Silvino Covington RN 4/9/2024 1106   8 : LEFT BASE Tissue PROSTATE NEEDLE BIOPSY LEFT SURGICAL PATHOLOGY EXAM Silvino Covington RN 4/9/2024 1106   9 : LEFT ANTERIOR Tissue PROSTATE NEEDLE BIOPSY LEFT SURGICAL PATHOLOGY EXAM Silvino Covington RN 4/9/2024 1106   10 : RIGHT TRANSITION ZONE TARGET Tissue PROSTATE, BIOPSY, RIGHT TRANSITION ZONE SURGICAL PATHOLOGY EXAM Silvino Covington RN 4/9/2024 1109        Staff:   Circulator: Silvino Covington RN  Scrub Person: Antonella Davila    Operative Indications: 68 year old male w/ dx of concerning PSA, PSA 5.04, who presents for transperineal biopsies.  He underwent preoperative MRI of his prostate which revealed a suspicious lesion which was marked for MRI fusion biopsy.  The patient was counseled regarding the risks, benefits, alternatives, equipment, and personnel involved and consented to proceed with surgical intervention.    Operative Findings: Uncomplicated transperineal biopsy with MRI fusion guidance    Operative Procedure:   The patient was correctly identified and the operative plan was confirmed with the patient and the operative team. A weight appropriate dose of prophylactic antibiotics was administered intravenously prior to the procedure and sequential compression devices were applied to the lower extremities and activated prior to induction of anesthesia. The patient was placed in supine position.  Monitored anesthesia was induced. The patient was repositioned in dorsal lithotomy position. All pressure points were padded per protocol.    A digital rectal exam revealed: Benign, no palpable disease    The operative area was then prepped and draped in the usual sterile fashion.    The transrectal ultrasound probe was inserted into the rectum.  The perineal skin was infiltrated with 5 cc of 0.25% Marcaine plain on both sides of the perineal raphae.  A bilateral pudendal nerve block was performed using the precision point guide.  Using a 22-gauge, 7 inch spinal needle 10 cc of  0.25% Marcaine were infiltrated on either side of the pelvic musculature towards the lateral aspect of the prostate as well as the needle tracks bilaterally.  In total, 30 cc of Marcaine were used.    There was a hypoechoic lesion that corresponded with the suspicious lesion seen on MRI.  MRI fusion was then performed using the Alediav device.  While prostate mapping was being done, the template biopsies were obtained.    An 18-gauge core biopsy needle was then used to obtain 2 biopsies from each site: Posterior medial, posterior lateral, prostate base, anterior prostate bilaterally.  In total, 16 biopsy cores were taken.    We then targeted the suspicious area using fusion technology and took several samples from this area.    Counts were correct. Sign-out was performed with the surgical team confirming the specimen listed below. The patient tolerated the procedure well, emerged from anesthesia without incident, and was transferred to PACU in stable condition.     I (Artie Neely MD) performed the procedure  Artie Neely  Phone Number: [phone]

## 2024-04-12 LAB
LABORATORY COMMENT REPORT: NORMAL
PATH REPORT.FINAL DX SPEC: NORMAL
PATH REPORT.GROSS SPEC: NORMAL
PATH REPORT.RELEVANT HX SPEC: NORMAL
PATH REPORT.TOTAL CANCER: NORMAL

## 2024-04-26 ENCOUNTER — APPOINTMENT (OUTPATIENT)
Dept: UROLOGY | Facility: CLINIC | Age: 69
End: 2024-04-26
Payer: MEDICARE

## 2024-05-17 ENCOUNTER — OFFICE VISIT (OUTPATIENT)
Dept: UROLOGY | Facility: CLINIC | Age: 69
End: 2024-05-17
Payer: MEDICARE

## 2024-05-17 VITALS — SYSTOLIC BLOOD PRESSURE: 134 MMHG | TEMPERATURE: 97.8 F | HEART RATE: 82 BPM | DIASTOLIC BLOOD PRESSURE: 78 MMHG

## 2024-05-17 DIAGNOSIS — R97.20 ELEVATED PSA: Primary | ICD-10-CM

## 2024-05-17 PROCEDURE — 3075F SYST BP GE 130 - 139MM HG: CPT | Performed by: UROLOGY

## 2024-05-17 PROCEDURE — 99213 OFFICE O/P EST LOW 20 MIN: CPT | Performed by: UROLOGY

## 2024-05-17 PROCEDURE — 3078F DIAST BP <80 MM HG: CPT | Performed by: UROLOGY

## 2024-05-17 PROCEDURE — 1160F RVW MEDS BY RX/DR IN RCRD: CPT | Performed by: UROLOGY

## 2024-05-17 PROCEDURE — 1159F MED LIST DOCD IN RCRD: CPT | Performed by: UROLOGY

## 2024-05-17 PROCEDURE — 1036F TOBACCO NON-USER: CPT | Performed by: UROLOGY

## 2024-05-17 NOTE — PROGRESS NOTES
PRIOR NOTES  68-year-old gentleman seeing me for consultation for possible prostate biopsy  PMH: Parkinson's, glaucoma, erectile dysfunction  Uncle with prostate cancer, no first-order relatives with prostate cancer or breast cancer     PSA history:  9/8/2023: 5.04  02/23: 4.87  08/22: 3.19  01/22: 2.65  07/21: 3.07  07/20: 3.52  08/19: 2.48     MRI prostate 10/11/2023: 46.4 g prostate, PSA density 0.11, PI-RADS 4 lesion anterior transition zone, PI-RADS 3 right apical transition zone     IPSS 9  IRENE 1    OR 4/9/24 - fusion bx - benign exam  - all benign    UPDATED SUBJECTIVE HISTORY  05/17/24 -doing well after biopsy    Past Medical History  He has a past medical history of Asthma ([facility]), Glaucoma, and Other specified health status.    Surgical History  He has a past surgical history that includes Colonoscopy (09/24/2013); Knee surgery (09/24/2013); Hernia repair (09/24/2013); Other surgical history (07/15/2020); and Colonoscopy (09/2023).     Social History  He reports that he has never smoked. He has never used smokeless tobacco. He reports that he does not drink alcohol and does not use drugs.    Family History  Family History   Problem Relation Name Age of Onset    Lung cancer Mother      Stroke Father          Allergies  Pollen extracts    ROS: 12 system review was completed and is negative with the exception of those signs and symptoms noted in the history of present illness: A 12 system review was completed and is negative with the exception of those signs and symptoms noted in the history of present illness.     Exam:  General: in NAD, appears stated age  Head: normocephalic, atraumatic  Respiratory: normal effort, no use of accessory muscles  Cardiovascular: no edema noted  Skin: normal turgor, no rashes  Neurologic: grossly intact, oriented to person/place/time  Psychiatric: mode and affect appropriate     Last Recorded Vitals  Blood pressure 134/78, pulse 82, temperature 36.6 °C (97.8 °F),  temperature source Temporal.    Lab Results   Component Value Date    PSASCREEN 4.87 (H) 02/08/2023    CREATININE 0.85 09/08/2023    HGB 13.8 09/08/2023         ASSESSMENT/PLAN:  # Elevated PSA  -S/p MRI fusion biopsy with no evidence of cancer, PSA density reassuring, concerning lesions were in the transitional zone which is more difficult to assess on MRI.  All reassuring  -Follow-up in 1 year with PSA    Artie Neely MD

## 2024-05-29 ENCOUNTER — OFFICE VISIT (OUTPATIENT)
Dept: NEUROLOGY | Facility: CLINIC | Age: 69
End: 2024-05-29
Payer: MEDICARE

## 2024-05-29 VITALS
SYSTOLIC BLOOD PRESSURE: 127 MMHG | DIASTOLIC BLOOD PRESSURE: 77 MMHG | HEART RATE: 90 BPM | RESPIRATION RATE: 18 BRPM | BODY MASS INDEX: 20.22 KG/M2 | WEIGHT: 145 LBS

## 2024-05-29 DIAGNOSIS — G20.A1 PARKINSON'S DISEASE WITHOUT DYSKINESIA OR FLUCTUATING MANIFESTATIONS (MULTI): ICD-10-CM

## 2024-05-29 PROCEDURE — 3078F DIAST BP <80 MM HG: CPT | Performed by: PSYCHIATRY & NEUROLOGY

## 2024-05-29 PROCEDURE — 99214 OFFICE O/P EST MOD 30 MIN: CPT | Performed by: PSYCHIATRY & NEUROLOGY

## 2024-05-29 PROCEDURE — 1160F RVW MEDS BY RX/DR IN RCRD: CPT | Performed by: PSYCHIATRY & NEUROLOGY

## 2024-05-29 PROCEDURE — 3074F SYST BP LT 130 MM HG: CPT | Performed by: PSYCHIATRY & NEUROLOGY

## 2024-05-29 PROCEDURE — 1036F TOBACCO NON-USER: CPT | Performed by: PSYCHIATRY & NEUROLOGY

## 2024-05-29 PROCEDURE — G2211 COMPLEX E/M VISIT ADD ON: HCPCS | Performed by: PSYCHIATRY & NEUROLOGY

## 2024-05-29 PROCEDURE — 1159F MED LIST DOCD IN RCRD: CPT | Performed by: PSYCHIATRY & NEUROLOGY

## 2024-05-29 RX ORDER — RASAGILINE 1 MG/1
TABLET ORAL
Qty: 90 TABLET | Refills: 3 | Status: SHIPPED | OUTPATIENT
Start: 2024-05-29

## 2024-05-29 ASSESSMENT — ENCOUNTER SYMPTOMS
OCCASIONAL FEELINGS OF UNSTEADINESS: 0
DEPRESSION: 0
LOSS OF SENSATION IN FEET: 0

## 2024-05-29 ASSESSMENT — PATIENT HEALTH QUESTIONNAIRE - PHQ9
SUM OF ALL RESPONSES TO PHQ9 QUESTIONS 1 AND 2: 0
2. FEELING DOWN, DEPRESSED OR HOPELESS: NOT AT ALL
1. LITTLE INTEREST OR PLEASURE IN DOING THINGS: NOT AT ALL

## 2024-05-29 NOTE — PROGRESS NOTES
Subjective     Mino Fernandez is a   68 y.o. year old male who presents with FUV of PD.  Visit type: follow up visit     HPI  Interval HX:  Tremor and stiffness are getting worse mildly,slowness and walking is stable.No fall recently.  Memory is fine.No VH.Swallowing difficulty for couple years and is careful to not chock.Denies urinary issues and  dizziness.  he is physically active and does lots of exercise like dancing,Boxing and drumming and walks.    Started experiencing L hand tremor 2016. Now has intermittent tremor in right hand for past year. Has been using PT to manage and has not tried medications before. Does note that he had some leg hesitation that improved with dance exercise. No significant impact on activity. Has some increased difficulty with typing, but notes no other significant functional impairment. Notes its worse with cold and anxiety. Usually steady when performing tasks, but notices it when he rests. Denies any history of abnormal sleep behavior. No bed partner, but sleep mar notes no significant movement. No vivid dreams. No anosmia. Memory has been doing well. Does note some delay in recall.  No reported falls.     Was previously dx as Parkinson's disease in 2016 by Dr. Vance based on mild LUE tremor. No head imaging since onset of symptoms     PMH: Asthma, white-coat hypertension, glaucoma     Social Hx: Does not drink. Never smoker. No illicit substance use      Family Hx: 1st cousin w/ Parkinson's (possibly related to agent orange exposure)     Allergies: alcohol, pollen    Patient Health Questionnaire-2 Score: 0          Review of Systems  All other system have been reviewed and are negative for complaint.  Objective   Neurological Exam    Physical Exam              MDS UPDRS Score:  Motor Examination  Is the patient on medication for treating the symptoms of Parkinson's Disease?: No  Is the patient on Levodopa?: No  Speech: 0  Facial Expression: 2  Rigidty Neck: 2  Rigidty RUE:  2  Rigidity - LUE: 2  Rigidity RLE: 2  Rigidity LLE: 2  Finger Tapping Right Hand: 3  Finger Tapping Left Hand: 3  Hand Movements- Right Hand: 3  Hand Movements- Left Hand: 3  Pronatiaon-Supination Movments - Right Hand: 3  Pronatiaon-Supination Movments Left Hand: 3  Toe Tapping Right Foot: 3  Toe Tapping - Left Foot: 3  Leg Agility - Right Le  Leg Agility - Left le  Arising from Chair: 0  Gait: 1  Freezing of Gait: 0  Postural Stability: 0  Posture: 0  Global Spontanteity of Movment ( Body Bradykinesia): 1  Postural Tremor - Right Hand: 1  Postural Tremor - Left hand: 1  Kinetic Tremor - Right hand: 1  Kinetic Tremor - Left hand: 1  Rest Tremor Amplitude - RUE: 2  Rest Tremor Amplitude - LUE: 2  Rest Tremor Amplitude - RLE: 0  Rest Tremor Amplitude - LLE: 0  Rest Tremor Amplitude - Lip/Jaw: 1  Constancy of Rest Tremor: 3    Were dyskinesias (chorea or dystonia) present during examination?: No  Hoen and Yahr Stage: 2                       Assessment/Plan Mino Fernandez is a right handed  68 y.o. year old male who presents with Parkinson's disease, initially diagnosed 2016. Appears to be tremor predominant subtype with relatively mild progression over past 7 years. Exam is remarkable for moderate Rest tremor and moderate Bradykinesia and rigidity.We recommended start medication like C/L or DA or MAO-B inhibitor .He is willing to start mild medication like MAO-B inhibitor.We started Rasagiline 1mg take half tablet first week then take one tablet daily.

## 2024-09-12 ENCOUNTER — LAB (OUTPATIENT)
Dept: LAB | Facility: LAB | Age: 69
End: 2024-09-12
Payer: MEDICARE

## 2024-09-12 ENCOUNTER — APPOINTMENT (OUTPATIENT)
Dept: PRIMARY CARE | Facility: CLINIC | Age: 69
End: 2024-09-12
Payer: MEDICARE

## 2024-09-12 VITALS — BODY MASS INDEX: 20.22 KG/M2 | WEIGHT: 145 LBS

## 2024-09-12 DIAGNOSIS — R53.81 MALAISE: ICD-10-CM

## 2024-09-12 DIAGNOSIS — E55.9 VITAMIN D DEFICIENCY: ICD-10-CM

## 2024-09-12 DIAGNOSIS — E78.2 HYPERLIPEMIA, MIXED: ICD-10-CM

## 2024-09-12 DIAGNOSIS — D64.9 ANEMIA, UNSPECIFIED TYPE: ICD-10-CM

## 2024-09-12 DIAGNOSIS — M79.642 PAIN OF LEFT HAND: ICD-10-CM

## 2024-09-12 DIAGNOSIS — R53.81 MALAISE: Primary | ICD-10-CM

## 2024-09-12 DIAGNOSIS — R03.0 PREHYPERTENSION: ICD-10-CM

## 2024-09-12 DIAGNOSIS — G20.A1 PARKINSON'S DISEASE WITHOUT FLUCTUATING MANIFESTATIONS, UNSPECIFIED WHETHER DYSKINESIA PRESENT (MULTI): ICD-10-CM

## 2024-09-12 DIAGNOSIS — J45.20 MILD INTERMITTENT ASTHMA, UNSPECIFIED WHETHER COMPLICATED (HHS-HCC): ICD-10-CM

## 2024-09-12 DIAGNOSIS — D64.9 ANEMIA, UNSPECIFIED TYPE: Primary | ICD-10-CM

## 2024-09-12 DIAGNOSIS — I10 WHITE COAT SYNDROME WITH HYPERTENSION: ICD-10-CM

## 2024-09-12 LAB
25(OH)D3 SERPL-MCNC: 63 NG/ML (ref 30–100)
ALBUMIN SERPL BCP-MCNC: 4.4 G/DL (ref 3.4–5)
ALP SERPL-CCNC: 79 U/L (ref 33–136)
ALT SERPL W P-5'-P-CCNC: 12 U/L (ref 10–52)
ANION GAP SERPL CALC-SCNC: 13 MMOL/L (ref 10–20)
APPEARANCE UR: CLEAR
AST SERPL W P-5'-P-CCNC: 15 U/L (ref 9–39)
BASOPHILS # BLD AUTO: 0.04 X10*3/UL (ref 0–0.1)
BASOPHILS NFR BLD AUTO: 1.1 %
BILIRUB SERPL-MCNC: 0.7 MG/DL (ref 0–1.2)
BILIRUB UR STRIP.AUTO-MCNC: NEGATIVE MG/DL
BUN SERPL-MCNC: 30 MG/DL (ref 6–23)
CALCIUM SERPL-MCNC: 9.6 MG/DL (ref 8.6–10.6)
CHLORIDE SERPL-SCNC: 103 MMOL/L (ref 98–107)
CHOLEST SERPL-MCNC: 147 MG/DL (ref 0–199)
CHOLESTEROL/HDL RATIO: 2.2
CO2 SERPL-SCNC: 30 MMOL/L (ref 21–32)
COLOR UR: YELLOW
CREAT SERPL-MCNC: 0.88 MG/DL (ref 0.5–1.3)
CREAT UR-MCNC: 114.3 MG/DL (ref 20–370)
EGFRCR SERPLBLD CKD-EPI 2021: >90 ML/MIN/1.73M*2
EOSINOPHIL # BLD AUTO: 0.07 X10*3/UL (ref 0–0.7)
EOSINOPHIL NFR BLD AUTO: 2 %
ERYTHROCYTE [DISTWIDTH] IN BLOOD BY AUTOMATED COUNT: 12.1 % (ref 11.5–14.5)
FOLATE SERPL-MCNC: 14 NG/ML
GLUCOSE SERPL-MCNC: 85 MG/DL (ref 74–99)
GLUCOSE UR STRIP.AUTO-MCNC: NORMAL MG/DL
HCT VFR BLD AUTO: 40.1 % (ref 41–52)
HDLC SERPL-MCNC: 66.7 MG/DL
HGB BLD-MCNC: 13.7 G/DL (ref 13.5–17.5)
IMM GRANULOCYTES # BLD AUTO: 0 X10*3/UL (ref 0–0.7)
IMM GRANULOCYTES NFR BLD AUTO: 0 % (ref 0–0.9)
KETONES UR STRIP.AUTO-MCNC: NEGATIVE MG/DL
LDLC SERPL CALC-MCNC: 68 MG/DL
LEUKOCYTE ESTERASE UR QL STRIP.AUTO: NEGATIVE
LYMPHOCYTES # BLD AUTO: 1.44 X10*3/UL (ref 1.2–4.8)
LYMPHOCYTES NFR BLD AUTO: 40.9 %
MCH RBC QN AUTO: 31.7 PG (ref 26–34)
MCHC RBC AUTO-ENTMCNC: 34.2 G/DL (ref 32–36)
MCV RBC AUTO: 93 FL (ref 80–100)
MICROALBUMIN UR-MCNC: <7 MG/L
MICROALBUMIN/CREAT UR: NORMAL MG/G{CREAT}
MONOCYTES # BLD AUTO: 0.48 X10*3/UL (ref 0.1–1)
MONOCYTES NFR BLD AUTO: 13.6 %
NEUTROPHILS # BLD AUTO: 1.49 X10*3/UL (ref 1.2–7.7)
NEUTROPHILS NFR BLD AUTO: 42.4 %
NITRITE UR QL STRIP.AUTO: NEGATIVE
NON HDL CHOLESTEROL: 80 MG/DL (ref 0–149)
NRBC BLD-RTO: 0 /100 WBCS (ref 0–0)
PH UR STRIP.AUTO: 6 [PH]
PLATELET # BLD AUTO: 158 X10*3/UL (ref 150–450)
POTASSIUM SERPL-SCNC: 3.9 MMOL/L (ref 3.5–5.3)
PROT SERPL-MCNC: 7 G/DL (ref 6.4–8.2)
PROT UR STRIP.AUTO-MCNC: NEGATIVE MG/DL
RBC # BLD AUTO: 4.32 X10*6/UL (ref 4.5–5.9)
RBC # UR STRIP.AUTO: NEGATIVE /UL
SODIUM SERPL-SCNC: 142 MMOL/L (ref 136–145)
SP GR UR STRIP.AUTO: 1.03
TRIGL SERPL-MCNC: 60 MG/DL (ref 0–149)
TSH SERPL-ACNC: 3.44 MIU/L (ref 0.44–3.98)
URATE SERPL-MCNC: 6.2 MG/DL (ref 4–7.5)
UROBILINOGEN UR STRIP.AUTO-MCNC: NORMAL MG/DL
VIT B12 SERPL-MCNC: >2000 PG/ML (ref 211–911)
VLDL: 12 MG/DL (ref 0–40)
WBC # BLD AUTO: 3.5 X10*3/UL (ref 4.4–11.3)

## 2024-09-12 PROCEDURE — 84443 ASSAY THYROID STIM HORMONE: CPT

## 2024-09-12 PROCEDURE — 82043 UR ALBUMIN QUANTITATIVE: CPT

## 2024-09-12 PROCEDURE — 85025 COMPLETE CBC W/AUTO DIFF WBC: CPT

## 2024-09-12 PROCEDURE — 1159F MED LIST DOCD IN RCRD: CPT | Performed by: INTERNAL MEDICINE

## 2024-09-12 PROCEDURE — 36415 COLL VENOUS BLD VENIPUNCTURE: CPT

## 2024-09-12 PROCEDURE — 1160F RVW MEDS BY RX/DR IN RCRD: CPT | Performed by: INTERNAL MEDICINE

## 2024-09-12 PROCEDURE — 82570 ASSAY OF URINE CREATININE: CPT

## 2024-09-12 PROCEDURE — 80053 COMPREHEN METABOLIC PANEL: CPT

## 2024-09-12 PROCEDURE — 81003 URINALYSIS AUTO W/O SCOPE: CPT

## 2024-09-12 PROCEDURE — 84550 ASSAY OF BLOOD/URIC ACID: CPT

## 2024-09-12 PROCEDURE — 99214 OFFICE O/P EST MOD 30 MIN: CPT | Performed by: INTERNAL MEDICINE

## 2024-09-12 PROCEDURE — 82306 VITAMIN D 25 HYDROXY: CPT

## 2024-09-12 PROCEDURE — 1036F TOBACCO NON-USER: CPT | Performed by: INTERNAL MEDICINE

## 2024-09-12 PROCEDURE — 82746 ASSAY OF FOLIC ACID SERUM: CPT

## 2024-09-12 PROCEDURE — 1126F AMNT PAIN NOTED NONE PRSNT: CPT | Performed by: INTERNAL MEDICINE

## 2024-09-12 PROCEDURE — 80061 LIPID PANEL: CPT

## 2024-09-12 PROCEDURE — 82607 VITAMIN B-12: CPT

## 2024-09-12 ASSESSMENT — ENCOUNTER SYMPTOMS
DIAPHORESIS: 0
PHOTOPHOBIA: 0
SORE THROAT: 0
SINUS PRESSURE: 0
FLANK PAIN: 0
SEIZURES: 0
DIZZINESS: 0
POLYPHAGIA: 0
MYALGIAS: 0
STRIDOR: 0
BLOOD IN STOOL: 0
NAUSEA: 0
COUGH: 0
ABDOMINAL PAIN: 0
DIFFICULTY URINATING: 0
APPETITE CHANGE: 0
CHILLS: 0
CONSTIPATION: 0
FREQUENCY: 0
CHOKING: 0
RHINORRHEA: 0
WOUND: 0
VOICE CHANGE: 0
FATIGUE: 0
EYE ITCHING: 0
SINUS PAIN: 0
FACIAL ASYMMETRY: 0
PALPITATIONS: 0
SHORTNESS OF BREATH: 0
EYE REDNESS: 0
HEMATURIA: 0
VOMITING: 0
ANAL BLEEDING: 0
TREMORS: 1
BACK PAIN: 0
LIGHT-HEADEDNESS: 0
BRUISES/BLEEDS EASILY: 0
HEADACHES: 0
NECK PAIN: 0
CHEST TIGHTNESS: 0
ARTHRALGIAS: 1
JOINT SWELLING: 0
POLYDIPSIA: 0
FACIAL SWELLING: 0
ACTIVITY CHANGE: 0
COLOR CHANGE: 0
WEAKNESS: 0
ABDOMINAL DISTENTION: 0
RECTAL PAIN: 0
NUMBNESS: 0
DIARRHEA: 0
DYSURIA: 0
EYE DISCHARGE: 0
NECK STIFFNESS: 0
SPEECH DIFFICULTY: 0
WHEEZING: 0
ADENOPATHY: 0
SLEEP DISTURBANCE: 0
TROUBLE SWALLOWING: 0
EYE PAIN: 0

## 2024-09-12 ASSESSMENT — PAIN SCALES - GENERAL: PAINLEVEL: 0-NO PAIN

## 2024-09-12 NOTE — PROGRESS NOTES
Subjective   Patient ID: Mino Fernandez is a 69 y.o. male who presents for Medicare Annual Wellness Visit Subsequent.    Patient presents for follow up. He has been compliant with his meds, diet and exercise. He fell a few months ago and injured his l hand. He continues to c/o pain. His tremor is at baseline. No HA,s , no dizziness, no CP or SOB. No abd pain no N?V?D.         Review of Systems   Constitutional:  Negative for activity change, appetite change, chills, diaphoresis and fatigue.   HENT:  Negative for congestion, dental problem, drooling, ear discharge, ear pain, facial swelling, hearing loss, mouth sores, nosebleeds, postnasal drip, rhinorrhea, sinus pressure, sinus pain, sneezing, sore throat, tinnitus, trouble swallowing and voice change.    Eyes:  Negative for photophobia, pain, discharge, redness, itching and visual disturbance.   Respiratory:  Negative for cough, choking, chest tightness, shortness of breath, wheezing and stridor.    Cardiovascular:  Negative for chest pain, palpitations and leg swelling.   Gastrointestinal:  Negative for abdominal distention, abdominal pain, anal bleeding, blood in stool, constipation, diarrhea, nausea, rectal pain and vomiting.   Endocrine: Negative for cold intolerance, heat intolerance, polydipsia, polyphagia and polyuria.   Genitourinary:  Negative for decreased urine volume, difficulty urinating, dysuria, enuresis, flank pain, frequency, genital sores, hematuria and urgency.   Musculoskeletal:  Positive for arthralgias (hand pain). Negative for back pain, gait problem, joint swelling, myalgias, neck pain and neck stiffness.   Skin:  Negative for color change, pallor, rash and wound.   Neurological:  Positive for tremors. Negative for dizziness, seizures, syncope, facial asymmetry, speech difficulty, weakness, light-headedness, numbness and headaches.   Hematological:  Negative for adenopathy. Does not bruise/bleed easily.   Psychiatric/Behavioral:  Negative  for sleep disturbance.        Objective   Wt 65.8 kg (145 lb)   BMI 20.22 kg/m²     Physical Exam  Constitutional:       Appearance: Normal appearance.   Cardiovascular:      Rate and Rhythm: Normal rate and regular rhythm.      Heart sounds: No murmur heard.     No gallop.   Pulmonary:      Effort: No respiratory distress.      Breath sounds: No wheezing or rales.   Abdominal:      General: There is no distension.      Palpations: There is no mass.      Tenderness: There is no abdominal tenderness. There is no guarding.   Musculoskeletal:      Right lower leg: No edema.      Left lower leg: No edema.   Neurological:      Mental Status: He is alert.      Gait: Gait abnormal.      Comments: Baseline tremor         Assessment/Plan   Diagnoses and all orders for this visit:  Malaise  -     CBC and Auto Differential; Future  -     TSH with reflex to Free T4 if abnormal; Future  Hyperlipemia, mixed- check lipid profile  -     Lipid Panel; Future  Prehypertension- bp is improved  -     Albumin-Creatinine Ratio, Urine Random; Future  -     Comprehensive Metabolic Panel; Future  -     Uric Acid; Future  -     Urinalysis with Reflex Microscopic; Future  Vitamin D deficiency  -     Vitamin D 25-Hydroxy,Total (for eval of Vitamin D levels); Future  Pain of left hand- refer to ortho  -     Referral to Orthopaedic Surgery; Future  White coat syndrome with hypertension- bp nl at home  Parkinson's disease without fluctuating manifestations, unspecified whether dyskinesia present (Multi)- stable sx. Follow-up with neuro  Mild intermittent asthma, unspecified whether complicated (HHS-HCC)- stable symptoms  Health maintenance- colonoscopy has been done. He will schedule eye + dental. Get td , rsv and flu at pharmacy.Derm has been done.  Elevated PSA- urology appt

## 2024-10-09 ENCOUNTER — APPOINTMENT (OUTPATIENT)
Dept: NEUROLOGY | Facility: CLINIC | Age: 69
End: 2024-10-09
Payer: MEDICARE

## 2024-10-09 VITALS
SYSTOLIC BLOOD PRESSURE: 122 MMHG | WEIGHT: 145 LBS | HEART RATE: 86 BPM | BODY MASS INDEX: 20.22 KG/M2 | RESPIRATION RATE: 16 BRPM | DIASTOLIC BLOOD PRESSURE: 71 MMHG

## 2024-10-09 DIAGNOSIS — G20.A1 PARKINSON'S DISEASE WITHOUT DYSKINESIA OR FLUCTUATING MANIFESTATIONS: Primary | ICD-10-CM

## 2024-10-09 PROCEDURE — 3074F SYST BP LT 130 MM HG: CPT | Performed by: PSYCHIATRY & NEUROLOGY

## 2024-10-09 PROCEDURE — 1159F MED LIST DOCD IN RCRD: CPT | Performed by: PSYCHIATRY & NEUROLOGY

## 2024-10-09 PROCEDURE — 99214 OFFICE O/P EST MOD 30 MIN: CPT | Performed by: PSYCHIATRY & NEUROLOGY

## 2024-10-09 PROCEDURE — 1160F RVW MEDS BY RX/DR IN RCRD: CPT | Performed by: PSYCHIATRY & NEUROLOGY

## 2024-10-09 PROCEDURE — 3078F DIAST BP <80 MM HG: CPT | Performed by: PSYCHIATRY & NEUROLOGY

## 2024-10-09 PROCEDURE — G2211 COMPLEX E/M VISIT ADD ON: HCPCS | Performed by: PSYCHIATRY & NEUROLOGY

## 2024-10-09 ASSESSMENT — UNIFIED PARKINSONS DISEASE RATING SCALE (UPDRS)
LEVODOPA: NO
TOETAPPING_RIGHT: 0
FREEZING_GAIT: 0
LEG_AGILITY_RIGHT: 1
AMPLITUDE_LLE: 0
POSTURAL_TREMOR_RIGHTHAND: 1
HANDMOVEMENTS_RIGHT: 2
PRONATION_SUPINATION_RIGHT: 1
GAIT: 1
AMPLITUDE_LIP_JAW: 0
SPONTANEITY_OF_MOVEMENT: 3
SPEECH: 3
FINGER_TAPPING_RIGHT: 2
HOEHN_YAHR: 2
CONSTANCY_TREMOR_ATREST: 3
PARKINSONS_MEDS: YES
RIGIDITY_RLE: 1
TOETAPPING_LEFT: 3
FINGER_TAPPING_LEFT: 3
LEG_AGILITY_LEFT: 1
AMPLITUDE_RLE: 0
DYSKINESIAS_PRESENT: NO
TOTAL_SCORE: 48
POSTURAL_STABILITY: 1
AMPLITUDE_RUE: 1
POSTURE: 1
POSTURAL_TREMOR_LEFTHAND: 1
KINETIC_TREMOR_RIGHTHAND: 1
KINETIC_TREMOR_LEFTHAND: 1
RIGIDITY_NECK: 3
AMPLITUDE_LUE: 1
CHAIR_RISING_SCALE: 0
RIGIDITY_LUE: 2
RIGIDITY_LLE: 1
RIGIDITY_RUE: 2
FACIAL_EXPRESSION: 3
PRONATION_SUPINATION_LEFT: 2
CLINICAL_STATE: ON

## 2024-10-09 ASSESSMENT — PATIENT HEALTH QUESTIONNAIRE - PHQ9
2. FEELING DOWN, DEPRESSED OR HOPELESS: NOT AT ALL
SUM OF ALL RESPONSES TO PHQ9 QUESTIONS 1 AND 2: 0
1. LITTLE INTEREST OR PLEASURE IN DOING THINGS: NOT AT ALL

## 2024-10-09 ASSESSMENT — ENCOUNTER SYMPTOMS
LOSS OF SENSATION IN FEET: 0
DEPRESSION: 0
OCCASIONAL FEELINGS OF UNSTEADINESS: 0

## 2024-10-09 NOTE — PATIENT INSTRUCTIONS
"It was a pleasure seeing you today for Parkinson's disease.  We discussed carbidopa/levodopa.    Please make a follow up appointment at the  or by calling the office in 4 months.     For any urgent issues or questions call [phone], option for doctor's , during our office hours Monday-Friday 8am-4:30pm, and leave a voicemail with your concern.  My office will try to reach back you as soon as possible within 24 (business) hours.  If you have an emergency please call 911 or visit a local urgent care or nearest emergency room.      Please understand that [employer] is a useful communication tool for simple \"normal\" results or a refill request but I would not recommend using this tool for emergent or urgent issues.  I am happy to ask my staff to arrange a follow up visit or a virtual visit sooner than requested with myself or Aravind (Nurse Practitioner), if appropriate for your care.    "

## 2024-10-09 NOTE — PROGRESS NOTES
Subjective     Mino Fernandez is a   69 y.o. year old male who presents with FUV of PD.  Visit type: follow up visit     Parkinson's Disease    Rasagiline reduced tremor amplitude a lot.  They remain constant.  Voice is softer.    Nothing else bothers him much.  Drooling is occuring.  He does not want to treat it yet.     Family Hx: 1st cousin w/ Parkinson's (possibly related to agent orange exposure)    Interval PMH: none    Prior History:  Started experiencing L hand tremor 2016. Was previously dx as Parkinson's disease in 2016 by Dr. Vance based on mild LUE tremor.      PMH: Asthma, white-coat hypertension, glaucoma     Patient Health Questionnaire-2 Score: 0          Review of Systems  All other system have been reviewed and are negative for complaint.  Objective     Vitals:    10/09/24 1323 10/09/24 1325   BP: 135/77 122/71   BP Location: Left arm Left arm   Patient Position: Sitting Standing   BP Cuff Size: Adult Adult   Pulse: 84 86   Resp: 16    Weight: 65.8 kg (145 lb)       Neurological Exam    Physical Exam    MDS UPDRS 1st Score: Motor Examination  Is the patient on medication for treating the symptoms of Parkinson's Disease?: Yes  Patients receiving medication for treating the symptoms of Parkinson's Disease, june the patient's clinical state.: On  Is the patient on Levodopa?: No  Speech: 3  Facial Expression: 3  Rigidty Neck: 3  Rigidty RUE: 2  Rigidity - LUE: 2  Rigidity RLE: 1  Rigidity LLE: 1  Finger Tapping Right Hand: 2  Finger Tapping Left Hand: 3  Hand Movements- Right Hand: 2  Hand Movements- Left Hand: 3  Pronatiaon-Supination Movments - Right Hand: 1  Pronatiaon-Supination Movments Left Hand: 2  Toe Tapping Right Foot: 0  Toe Tapping - Left Foot: 3  Leg Agility - Right Le  Leg Agility - Left le  Arising from Chair: 0  Gait: 1  Freezing of Gait: 0  Postural Stability: 1  Posture: 1  Global Spontanteity of Movment ( Body Bradykinesia): 3  Postural Tremor - Right Hand: 1  Postural  Tremor - Left hand: 1  Kinetic Tremor - Right hand: 1  Kinetic Tremor - Left hand: 1  Rest Tremor Amplitude - RUE: 1  Rest Tremor Amplitude - LUE: 1  Rest Tremor Amplitude - RLE: 0  Rest Tremor Amplitude - LLE: 0  Rest Tremor Amplitude - Lip/Jaw: 0  Constancy of Rest Tremor: 3  MDS UPDRS Total Score: 48  Were dyskinesias (chorea or dystonia) present during examination?: No  Hoen and Yahr Stage: 2                           Assessment/Plan Mino Fernandez is a right handed  69 y.o.  male who presents with Parkinson's disease, initially diagnosed 2016. Appears to be tremor predominant subtype with relatively mild progression since 2016. Exam is remarkable for moderate Rest tremor and moderate Bradykinesia and rigidity.Rasagiline helps partally.  We had a long conversation about adding levodopa.  I answered his questions about the benefits and drawbacks.  We discussed data for earlier levodopa use.  We discussed dyskinesia.  He wants to think about it.  He will follow up in about 4-6 months.        CODING and DOCUMENTATION DETERMINATION  For the Evaluation and Management of this patient, the level of Medical Decision Making for this visit was determined based on the following:  The level of COMPLEXITY AND NUMBER OF PROBLEMS ADDRESSED was [HIGH, MODERATE, LOW] as determined by:   MODERATE:  one chronic illnesses with exacerbation, progression or side effect of Rx.  The level of RISK OF COMPLICATIONS was MODERATE as determined by: MODERATE: prescription drug management.  Thus, the level of medical decision making (based on the lower of the two highest elements) was determined to be MODERATE

## 2024-10-13 NOTE — PROGRESS NOTES
[facility]  Hand and Upper Extremity Service  Initial evaluation / Consultation         Consult requested by Referring Physician: Dr. Bermudez     Chief Complaint: Left hand        69 y.o right hand dominant male with history of Parkinson's disease presenting for left hand stiffness. He tripped and fell over the summer and jammed multiple left hand fingers and has been working on trying to get them to move better. They've improved slowly but he still has a fair amount of stiffness and discomfort in his fingers. Pain is most noticeable when he tries to bend the fingers and he localizes it to his PIP joints. He denies any numbness, tingling, or triggering.            Please refer to New Patient Intake Form scanned into patient's electronic record for self reported past medical history, past surgical history, medications, allergies, family history, social history and 10 point review of systems    Examination:  Constitutional: Oriented to person, place, and time.  Appears well-developed and well-nourished.  Head: Normocephalic and atraumatic.  Eyes: Pupils are equal, round, and reactive to light.  Cardiovascular: Intact distal pulses.  Pulmonary/Chest/Breast: Effort normal. No respiratory distress.  Neurological: Alert and oriented to person, place, and time.  Skin: Skin is warm and dry.  Psychiatric: normal mood and affect.  Behavior is normal.  Musculoskeletal: Left hand reveals resting tremor related to Parkinson's disease. No swelling, skin changes, or deformities. No focal tenderness to palpation along the flexor or extensor surfaces of fingers. Full active and passive flexion on index finger but limitations to active and passive flexion of his middle, ring, and small finger primarily with stiffness at the PIP joint. No evidence of triggering.        Personal Interpretation of Diagnostic studies: X-rays of left hand taken today demonstrate normal skeletal findings. No evidence of  fractures or dislocations.        Impression:  Left hand stiffness       Plan: This is likely a direct result of the jamming type injury he sustained earlier this summer. He believes his symptoms have improved over time. I've referred him to occupational therapy to see if they can improve function and range of motion. I'm happy to see him in the future should he have further concerns.       Follow up: As needed              Dxa Hughes MD  [facility]  Department of Orthopaedic Surgery  Hand and Upper Extremity Reconstruction      Scribe Attestation  By signing my name below, I, Timoteo Heart   attest that this documentation has been prepared under the direction and in the presence of Dr. Dax Hughes.      Dictation performed with the use of voice recognition software.  Syntax and grammatical errors may exist.

## 2024-10-14 DIAGNOSIS — M79.602 PAIN OF LEFT UPPER EXTREMITY: Primary | ICD-10-CM

## 2024-10-15 ENCOUNTER — HOSPITAL ENCOUNTER (OUTPATIENT)
Dept: RADIOLOGY | Facility: HOSPITAL | Age: 69
Discharge: HOME | End: 2024-10-15
Payer: MEDICARE

## 2024-10-15 ENCOUNTER — OFFICE VISIT (OUTPATIENT)
Dept: ORTHOPEDIC SURGERY | Facility: HOSPITAL | Age: 69
End: 2024-10-15
Payer: MEDICARE

## 2024-10-15 VITALS — WEIGHT: 145 LBS | BODY MASS INDEX: 20.3 KG/M2 | HEIGHT: 71 IN

## 2024-10-15 DIAGNOSIS — M79.642 PAIN OF LEFT HAND: ICD-10-CM

## 2024-10-15 DIAGNOSIS — M79.602 PAIN OF LEFT UPPER EXTREMITY: ICD-10-CM

## 2024-10-15 DIAGNOSIS — M25.642 STIFFNESS OF LEFT HAND JOINT: Primary | ICD-10-CM

## 2024-10-15 PROCEDURE — 99203 OFFICE O/P NEW LOW 30 MIN: CPT | Performed by: ORTHOPAEDIC SURGERY

## 2024-10-15 PROCEDURE — 99213 OFFICE O/P EST LOW 20 MIN: CPT | Performed by: ORTHOPAEDIC SURGERY

## 2024-10-15 PROCEDURE — 1036F TOBACCO NON-USER: CPT | Performed by: ORTHOPAEDIC SURGERY

## 2024-10-15 PROCEDURE — 3008F BODY MASS INDEX DOCD: CPT | Performed by: ORTHOPAEDIC SURGERY

## 2024-10-15 PROCEDURE — 73130 X-RAY EXAM OF HAND: CPT | Mod: LT

## 2024-10-15 PROCEDURE — 1159F MED LIST DOCD IN RCRD: CPT | Performed by: ORTHOPAEDIC SURGERY

## 2024-10-15 PROCEDURE — 73130 X-RAY EXAM OF HAND: CPT | Mod: LEFT SIDE | Performed by: RADIOLOGY

## 2024-10-15 ASSESSMENT — PAIN SCALES - GENERAL: PAINLEVEL_OUTOF10: 5 - MODERATE PAIN

## 2024-10-15 ASSESSMENT — PAIN - FUNCTIONAL ASSESSMENT: PAIN_FUNCTIONAL_ASSESSMENT: 0-10

## 2024-10-15 ASSESSMENT — PAIN DESCRIPTION - DESCRIPTORS: DESCRIPTORS: ACHING;SORE

## 2024-10-15 NOTE — LETTER
October 15, 2024     Shon Bermudez MD  [address] [facility] [address]    Patient: Mino Fernandez   YOB: 1955   Date of Visit: 10/15/2024       Dear Dr. Shon Bermudez MD:    Thank you for referring Mino Fernandez to me for evaluation. Below are my notes for this consultation.  If you have questions, please do not hesitate to call me. I look forward to following your patient along with you.       Sincerely,     Dax Hughes MD      CC: No Recipients  ______________________________________________________________________________________    [facility]  Hand and Upper Extremity Service  Initial evaluation / Consultation         Consult requested by Referring Physician: Dr. Bermudez     Chief Complaint: Left hand        69 y.o right hand dominant male with history of Parkinson's disease presenting for left hand stiffness. He tripped and fell over the summer and jammed multiple left hand fingers and has been working on trying to get them to move better. They've improved slowly but he still has a fair amount of stiffness and discomfort in his fingers. Pain is most noticeable when he tries to bend the fingers and he localizes it to his PIP joints. He denies any numbness, tingling, or triggering.            Please refer to New Patient Intake Form scanned into patient's electronic record for self reported past medical history, past surgical history, medications, allergies, family history, social history and 10 point review of systems    Examination:  Constitutional: Oriented to person, place, and time.  Appears well-developed and well-nourished.  Head: Normocephalic and atraumatic.  Eyes: Pupils are equal, round, and reactive to light.  Cardiovascular: Intact distal pulses.  Pulmonary/Chest/Breast: Effort normal. No respiratory distress.  Neurological: Alert and oriented to person, place, and time.  Skin: Skin is warm and dry.  Psychiatric: normal mood and affect.   Behavior is normal.  Musculoskeletal: Left hand reveals resting tremor related to Parkinson's disease. No swelling, skin changes, or deformities. No focal tenderness to palpation along the flexor or extensor surfaces of fingers. Full active and passive flexion on index finger but limitations to active and passive flexion of his middle, ring, and small finger primarily with stiffness at the PIP joint. No evidence of triggering.        Personal Interpretation of Diagnostic studies: X-rays of left hand taken today demonstrate normal skeletal findings. No evidence of fractures or dislocations.        Impression:  Left hand stiffness       Plan: This is likely a direct result of the jamming type injury he sustained earlier this summer. He believes his symptoms have improved over time. I've referred him to occupational therapy to see if they can improve function and range of motion. I'm happy to see him in the future should he have further concerns.       Follow up: As needed              Dax Hughes MD  [facility]  Department of Orthopaedic Surgery  Hand and Upper Extremity Reconstruction      Scribe Attestation  By signing my name below, I, Agapito De Dios , Scribe   attest that this documentation has been prepared under the direction and in the presence of Dr. Dax Hughes.      Dictation performed with the use of voice recognition software.  Syntax and grammatical errors may exist.

## 2024-10-24 ENCOUNTER — TELEPHONE (OUTPATIENT)
Dept: NEUROLOGY | Facility: CLINIC | Age: 69
End: 2024-10-24
Payer: MEDICARE

## 2024-10-24 DIAGNOSIS — G20.A1 PARKINSON'S DISEASE WITHOUT DYSKINESIA OR FLUCTUATING MANIFESTATIONS: Primary | ICD-10-CM

## 2024-10-24 RX ORDER — CARBIDOPA AND LEVODOPA 25; 100 MG/1; MG/1
1 TABLET ORAL 3 TIMES DAILY
Qty: 90 TABLET | Refills: 11 | Status: SHIPPED | OUTPATIENT
Start: 2024-10-24 | End: 2025-10-24

## 2024-10-24 NOTE — TELEPHONE ENCOUNTER
Patient called and had questions regarding side effects of carbidopa/levodopa. Side effects were discussed and patient elected to start on this medication. The following instructions were discussed:    I recommend starting carbidopa/levodopa (Sinemet) 25/100mg tablets with the following titration schedule:  WEEK 1: take 0.5 tab (half) tab 3 times a day  WEEK 2: take 1 tab 3 times a day and stay at this dose  You may take these tablets with every meal (breakfast, lunch, and dinner- roughly every 4-5 hours).    After about 6 weeks at this dose, let me know how you are doing.     Side effects include but are not limited to: sleepiness, nausea, constipation, dizziness, hallucinations, or involuntary wiggling movements. If you experience any side effects please call our clinic.

## 2024-10-31 ENCOUNTER — EVALUATION (OUTPATIENT)
Dept: OCCUPATIONAL THERAPY | Facility: CLINIC | Age: 69
End: 2024-10-31
Payer: MEDICARE

## 2024-10-31 DIAGNOSIS — M25.642 STIFFNESS OF LEFT HAND JOINT: ICD-10-CM

## 2024-10-31 PROCEDURE — 97110 THERAPEUTIC EXERCISES: CPT | Mod: GO

## 2024-10-31 PROCEDURE — 97165 OT EVAL LOW COMPLEX 30 MIN: CPT | Mod: GO

## 2024-10-31 ASSESSMENT — PAIN DESCRIPTION - DESCRIPTORS: DESCRIPTORS: ACHING;SORE;TIGHTNESS

## 2024-10-31 ASSESSMENT — PAIN SCALES - GENERAL: PAINLEVEL_OUTOF10: 2

## 2024-10-31 ASSESSMENT — PATIENT HEALTH QUESTIONNAIRE - PHQ9
SUM OF ALL RESPONSES TO PHQ9 QUESTIONS 1 AND 2: 0
1. LITTLE INTEREST OR PLEASURE IN DOING THINGS: NOT AT ALL
2. FEELING DOWN, DEPRESSED OR HOPELESS: NOT AT ALL

## 2024-10-31 ASSESSMENT — PAIN - FUNCTIONAL ASSESSMENT: PAIN_FUNCTIONAL_ASSESSMENT: 0-10

## 2024-11-07 ENCOUNTER — TREATMENT (OUTPATIENT)
Dept: OCCUPATIONAL THERAPY | Facility: CLINIC | Age: 69
End: 2024-11-07
Payer: MEDICARE

## 2024-11-07 DIAGNOSIS — M25.642 STIFFNESS OF LEFT HAND JOINT: ICD-10-CM

## 2024-11-07 PROCEDURE — 97022 WHIRLPOOL THERAPY: CPT | Mod: GO

## 2024-11-07 PROCEDURE — 97035 APP MDLTY 1+ULTRASOUND EA 15: CPT | Mod: GO

## 2024-11-07 NOTE — PROGRESS NOTES
Occupational Therapy  Occupational Therapy Progress Note    Patient Name Mino Fernandez   MRN: [id number]  Today's Date: 11/7/2024     Time in: 1015 am  Time out: 1045 am  Total time: 30 minutes    Insurance:    (per information provided by  pre-cert team)  Visit #: 2  Approval required:  N  Medicare certification dates:  10/31/2024-12/12/2024    Therapy Diagnoses:   1. Stiffness of left hand joint  Follow Up In Occupational Therapy          General:  Reason for visit: eval and treat for left hand stiffness   Referred by: Dr. Hughes  Script:  OT  Onset Date:  July 2024      Subjective:   Patient reports:  A little sore after the home exercises    Have you fallen since last visit:  N    Precautions: moderate fall risk  none    Pain:  4/10  Location/Type of pain:  left hand    HEP compliance/understanding:  reviewed home program for clarification    Objective:   Pt. Verbalized increased pain with use this date.  Pt. Verbalized understanding of home program after review.  Treatment:   **= HEP progression today NV= Next visit  np= not performed  nb= non-billable  G= group HEP= discharged to HEP      Therapeutic Exercise:   5    minutes  Review and demonstration of home program        Modalities:       Fluidotherapy 15 minutes  AROM while in fluidotherapy    Ultrasound  10          minutes  3 MHZ, 1.0 W/cm2,continuous duty cycle to LMF and LRF 5 minutes volar, 5 minutes dorsal surface      Evaluation Complexity: Low      Assessment:  Patient tolerated treatment well, did well with progression this date of verbalized understanding of home program.   Patient needs continued work on/skilled OT for: LUE to address remaining functional, objective and subjective deficits to allow them to return to prior /optimal level of function with ADLs/home tasks  Patient is progressing with goals: increased understanding of home program  Skilled care:  Fluidotherapy, U/S, therapeutic exercise, pt. Education, home program instruction  Pt.  Left clinic with all questions answered  Plan:     1.  Patient to improve QuickDash score to at or below 15 for increased independence with ADLs/home tasks.  2.  Patient to improve AROM of left hand WATKINS  to 225 to improve  for increased independence with ADLs/home tasks.  3.  Patient to improve left  strength to 70# to increase use of hand for lifting and carrying tasks.  4. Patient to improve left hand coordination on 9 hole peg test to 35 sec to improve fine motor ADL's  5.  Patient to improve max pain levels to 1/10 with use.   Continue to progress per poc and to improve functional mobility with decreased symptoms    HEP:  review

## 2024-11-11 ENCOUNTER — TREATMENT (OUTPATIENT)
Dept: OCCUPATIONAL THERAPY | Facility: CLINIC | Age: 69
End: 2024-11-11
Payer: MEDICARE

## 2024-11-11 DIAGNOSIS — M25.642 STIFFNESS OF LEFT HAND JOINT: ICD-10-CM

## 2024-11-11 PROCEDURE — 97035 APP MDLTY 1+ULTRASOUND EA 15: CPT | Mod: GO

## 2024-11-11 PROCEDURE — 97140 MANUAL THERAPY 1/> REGIONS: CPT | Mod: GO

## 2024-11-11 PROCEDURE — 97022 WHIRLPOOL THERAPY: CPT | Mod: GO

## 2024-11-11 NOTE — PROGRESS NOTES
Occupational Therapy  Occupational Therapy Progress Note    Patient Name Mino Fernandez   MRN: [id number]  Today's Date: 11/11/2024     Time in: 1045 am  Time out: 1130 am  Total time: 45 minutes    Insurance:    (per information provided by  pre-cert team)  Visit #: 3  Approval required:  N  Medicare certification dates:  10/31/2024-12/12/2024    Therapy Diagnoses:   1. Stiffness of left hand joint  Follow Up In Occupational Therapy          General:  Reason for visit: eval and treat for left hand stiffness   Referred by: Dr. Hughes  Script:  OT  Onset Date:  July 2024      Subjective:   Patient reports: Moving a little better, but continued stiffness.    Have you fallen since last visit:  N    Precautions: moderate fall risk  none    Pain:  4/10  Location/Type of pain:  left hand    HEP compliance/understanding:  compliant    Objective:   Improved flexibility this date by end of session.  Improved ability to make a loose fist.   Treatment:   **= HEP progression today NV= Next visit  np= not performed  nb= non-billable  G= group HEP= discharged to HEP      Therapeutic Exercise:   5    minutes  Review home program    Manual : 15 minutes  -- Retrograde massage, gentle PROM/stretch to left hand    Modalities:       Fluidotherapy 15 minutes  AROM while in fluidotherapy    Ultrasound  10          minutes  3 MHZ, 1.0 W/cm2,continuous duty cycle to LMF and LRF 5 minutes volar, 5 minutes dorsal surface      Evaluation Complexity: Low      Assessment:  Patient tolerated treatment well, did well with progression this date with improved ability to make a loose fist by end of session. Pt. Verbalized compliance with home program.   Patient needs continued work on/skilled OT for: LUE to address remaining functional, objective and subjective deficits to allow them to return to prior /optimal level of function with ADLs/home tasks  Patient is progressing with goals: increased understanding of home program  Skilled care:   Fluidotherapy, U/S, therapeutic exercise, pt. Education, home program instruction  Pt. Left clinic with all questions answered  Plan:     1.  Patient to improve QuickDash score to at or below 15 for increased independence with ADLs/home tasks.  2.  Patient to improve AROM of left hand WATKINS  to 225 to improve  for increased independence with ADLs/home tasks.  3.  Patient to improve left  strength to 70# to increase use of hand for lifting and carrying tasks.  4. Patient to improve left hand coordination on 9 hole peg test to 35 sec to improve fine motor ADL's  5.  Patient to improve max pain levels to 1/10 with use.   Continue to progress per poc and to improve functional mobility with decreased symptoms    HEP:  review

## 2024-11-12 ENCOUNTER — APPOINTMENT (OUTPATIENT)
Dept: DERMATOLOGY | Facility: CLINIC | Age: 69
End: 2024-11-12
Payer: MEDICARE

## 2024-11-12 DIAGNOSIS — Z12.83 SCREENING EXAM FOR SKIN CANCER: ICD-10-CM

## 2024-11-12 DIAGNOSIS — D22.9 MULTIPLE BENIGN NEVI: Primary | ICD-10-CM

## 2024-11-12 DIAGNOSIS — L81.4 LENTIGO: ICD-10-CM

## 2024-11-12 DIAGNOSIS — L21.9 SEBORRHEIC DERMATITIS: ICD-10-CM

## 2024-11-12 DIAGNOSIS — L82.1 SEBORRHEIC KERATOSIS: ICD-10-CM

## 2024-11-12 PROCEDURE — 1160F RVW MEDS BY RX/DR IN RCRD: CPT | Performed by: DERMATOLOGY

## 2024-11-12 PROCEDURE — 1036F TOBACCO NON-USER: CPT | Performed by: DERMATOLOGY

## 2024-11-12 PROCEDURE — 99213 OFFICE O/P EST LOW 20 MIN: CPT | Performed by: DERMATOLOGY

## 2024-11-12 PROCEDURE — 1159F MED LIST DOCD IN RCRD: CPT | Performed by: DERMATOLOGY

## 2024-11-12 RX ORDER — KETOCONAZOLE 20 MG/ML
SHAMPOO, SUSPENSION TOPICAL 3 TIMES WEEKLY
Qty: 360 ML | Refills: 6 | Status: SHIPPED | OUTPATIENT
Start: 2024-11-13

## 2024-11-12 ASSESSMENT — DERMATOLOGY QUALITY OF LIFE (QOL) ASSESSMENT
RATE HOW BOTHERED YOU ARE BY SYMPTOMS OF YOUR SKIN PROBLEM (EG, ITCHING, STINGING BURNING, HURTING OR SKIN IRRITATION): 0 - NEVER BOTHERED
ARE THERE EXCLUSIONS OR EXCEPTIONS FOR THE QUALITY OF LIFE ASSESSMENT: NO
DATE THE QUALITY-OF-LIFE ASSESSMENT WAS COMPLETED: 67156
RATE HOW BOTHERED YOU ARE BY EFFECTS OF YOUR SKIN PROBLEMS ON YOUR ACTIVITIES (EG, GOING OUT, ACCOMPLISHING WHAT YOU WANT, WORK ACTIVITIES OR YOUR RELATIONSHIPS WITH OTHERS): 0 - NEVER BOTHERED
RATE HOW EMOTIONALLY BOTHERED YOU ARE BY YOUR SKIN PROBLEM (FOR EXAMPLE, WORRY, EMBARRASSMENT, FRUSTRATION): 0 - NEVER BOTHERED

## 2024-11-12 ASSESSMENT — DERMATOLOGY PATIENT ASSESSMENT
DO YOU USE A TANNING BED: NO
ARE YOU AN ORGAN TRANSPLANT RECIPIENT: NO
DO YOU HAVE ANY NEW OR CHANGING LESIONS: NO
HAVE YOU HAD OR DO YOU HAVE VASCULAR DISEASE: NO
HAVE YOU HAD OR DO YOU HAVE A STAPH INFECTION: NO
DO YOU USE SUNSCREEN: OCCASIONALLY

## 2024-11-12 ASSESSMENT — PATIENT GLOBAL ASSESSMENT (PGA): PATIENT GLOBAL ASSESSMENT: PATIENT GLOBAL ASSESSMENT:  1 - CLEAR

## 2024-11-12 ASSESSMENT — ITCH NUMERIC RATING SCALE: HOW SEVERE IS YOUR ITCHING?: 0

## 2024-11-12 NOTE — PROGRESS NOTES
Subjective     Mino Fernandez is a 69 y.o. male who presents for the following: Skin Check. Last derm visit 11/8/23 for Full Skin Exam - history of actinic damage    Review of Systems:  No other skin or systemic complaints other than what is documented elsewhere in the note.    The following portions of the chart were reviewed this encounter and updated as appropriate:  Tobacco  Allergies  Meds  Problems  Med Hx  Surg Hx         Skin Cancer History  No skin cancer on file.      Specialty Problems          Dermatology Problems    Seborrheic dermatitis        Objective   Well appearing patient in no apparent distress; mood and affect are within normal limits.    A full examination was performed including scalp, head, eyes, ears, nose, lips, neck, chest, axillae, abdomen, back, buttocks, bilateral upper extremities, bilateral lower extremities, hands, feet, fingers, toes, fingernails, and toenails. All findings within normal limits unless otherwise noted below.    Assessment/Plan   1. Multiple benign nevi  Scattered, uniform and benign-appearing, regular brown melanocytic papules and macules.    - Discussed benign nature and that no treatment is necessary unless it becomes painful or increases in size. Patient opts for clinical monitoring at this time.     2. Screening exam for skin cancer    Full body skin exam  -No lesions concerning for malignancy on the remainder the skin exam today   - The ugly duckling sign was discussed. Monitor for any skin lesions that are different in color, shape, or size than others on body  -Sun protection was discussed. Recommend SPF 30+, hats with brims, sun protective clothing, and avoiding sun exposure between 10 AM and 2 PM whenever possible  -Recommend regular skin exams or sooner if new or changing lesions       Related Procedures  Follow Up In Dermatology - Established Patient  Follow Up In Dermatology - Established Patient    3. Seborrheic keratosis  Stuck on verrucous,  tan-brown papules and plaques.      - Discussed benign nature and that no treatment is necessary unless it becomes painful or increases in size. Patient opts for clinical monitoring at this time.     4. Lentigo  Scattered tan macules in sun-exposed areas.    - Discussed benign nature and that no treatment is necessary unless it becomes painful or increases in size. Patient opts for clinical monitoring at this time.     5. Seborrheic dermatitis  Minimal flaking of eyebrows and scalp, fairly well-controlled    Seborrheic dermatitis, face and scalp  - needs refills on shampoo  - he has enough of cream  - well-controlled  - related to Parkinson's    Related Medications  ketoconazole (NIZOral) 2 % shampoo  Apply topically 3 (three) times a week.        Follow up 1 year Full Skin Exam

## 2024-11-18 ENCOUNTER — TREATMENT (OUTPATIENT)
Dept: OCCUPATIONAL THERAPY | Facility: CLINIC | Age: 69
End: 2024-11-18
Payer: MEDICARE

## 2024-11-18 DIAGNOSIS — M25.642 STIFFNESS OF LEFT HAND JOINT: ICD-10-CM

## 2024-11-18 PROCEDURE — 97022 WHIRLPOOL THERAPY: CPT | Mod: GO

## 2024-11-18 PROCEDURE — 97140 MANUAL THERAPY 1/> REGIONS: CPT | Mod: GO

## 2024-11-18 PROCEDURE — 97035 APP MDLTY 1+ULTRASOUND EA 15: CPT | Mod: GO

## 2024-11-18 NOTE — PROGRESS NOTES
Occupational Therapy  Occupational Therapy Progress Note    Patient Name Mino Fernandez   MRN: [id number]  Today's Date: 11/18/2024     Time in: 1045 am  Time out: 1130 am  Total time: 45 minutes    Insurance:    (per information provided by  pre-cert team)  Visit #: 4  Approval required:  N  Medicare certification dates:  10/31/2024-12/12/2024    Therapy Diagnoses:   1. Stiffness of left hand joint  Follow Up In Occupational Therapy          General:  Reason for visit: eval and treat for left hand stiffness   Referred by: Dr. Hughes  Script:  OT  Onset Date:  July 2024      Subjective:   Patient reports: No real pain    Have you fallen since last visit:  N    Precautions: moderate fall risk  none    Pain:  1/10  Location/Type of pain:  left hand ache    HEP compliance/understanding:  compliant    Objective:   HAND STRENGTH (Lbs)   R L   Dynamometer  70 58 ( was 50)   Lateral Pinch 15 13 (was 16)   3jaw Pinch 15.5 13 (was 15)     2pp                                             10                                                6 (was 7)    Treatment:   **= HEP progression today NV= Next visit  np= not performed  nb= non-billable  G= group HEP= discharged to HEP      Therapeutic Exercise:   5    minutes  Review home program and check strength    Manual : 15 minutes  -- Retrograde massage, gentle PROM/stretch to left hand    Modalities:       Fluidotherapy 15 minutes  AROM while in fluidotherapy    Ultrasound  10          minutes  3 MHZ, 1.0 W/cm2,continuous duty cycle to LMF and LRF 5 minutes volar, 5 minutes dorsal surface      Evaluation Complexity: Low      Assessment:  Patient tolerated treatment well, did well with progression this date with continued ability to make a fist.  Decreased pain verbalized  this date with improved ability with daily tasks. Improved  strength in left hand.   Patient needs continued work on/skilled OT for: LUE to address remaining functional, objective and subjective deficits to  allow them to return to prior /optimal level of function with ADLs/home tasks  Patient is progressing with goals: increased understanding of home program  Skilled care:  Fluidotherapy, U/S, therapeutic exercise, pt. Education, home program instruction  Pt. Left clinic with all questions answered  Plan:     1.  Patient to improve QuickDash score to at or below 15 for increased independence with ADLs/home tasks.  2.  Patient to improve AROM of left hand WATKINS  to 225 to improve  for increased independence with ADLs/home tasks.  3.  Patient to improve left  strength to 70# to increase use of hand for lifting and carrying tasks.  4. Patient to improve left hand coordination on 9 hole peg test to 35 sec to improve fine motor ADL's  5.  Patient to improve max pain levels to 1/10 with use.   Continue to progress per poc and to improve functional mobility with decreased symptoms    HEP:  review

## 2024-11-25 ENCOUNTER — TREATMENT (OUTPATIENT)
Dept: OCCUPATIONAL THERAPY | Facility: CLINIC | Age: 69
End: 2024-11-25
Payer: MEDICARE

## 2024-11-25 DIAGNOSIS — M25.642 STIFFNESS OF LEFT HAND JOINT: ICD-10-CM

## 2024-11-25 PROCEDURE — 97022 WHIRLPOOL THERAPY: CPT | Mod: GO

## 2024-11-25 PROCEDURE — 97140 MANUAL THERAPY 1/> REGIONS: CPT | Mod: GO

## 2024-11-25 PROCEDURE — 97035 APP MDLTY 1+ULTRASOUND EA 15: CPT | Mod: GO

## 2024-11-25 NOTE — PROGRESS NOTES
Occupational Therapy  Occupational Therapy Progress Note    Patient Name Mino Fernandez   MRN: [id number]  Today's Date: 11/25/2024     Time in: 1045 am  Time out: 1130 am  Total time: 45 minutes    Insurance:    (per information provided by  pre-cert team)  Visit #: 5  Approval required:  N  Medicare certification dates:  10/31/2024-12/12/2024    Therapy Diagnoses:   1. Stiffness of left hand joint  Follow Up In Occupational Therapy    Follow Up In Occupational Therapy          General:  Reason for visit: eval and treat for left hand stiffness   Referred by: Dr. Hughes  Script:  OT  Onset Date:  July 2024      Subjective:   Patient reports: No real pain, still hard to  and make a tight fist    Have you fallen since last visit:  N    Precautions: moderate fall risk  none    Pain:  1/10  Location/Type of pain:  left hand ache    HEP compliance/understanding:  compliant    Objective:     9HPT   R= 30 sec            L= 52 sec (was 56 sec)                      Treatment:   **= HEP progression today NV= Next visit  np= not performed  nb= non-billable  G= group HEP= discharged to HEP      Therapeutic Exercise:   5    minutes  Review home program and added green therapy ball to add to resistive gripping home program.  Pt. Will use this to alternate with theraputty as pt. Verbalized increased ease with using and at times when warmed up, sticky consistency.     Manual : 15 minutes  -- Retrograde massage, gentle PROM/stretch to left hand    Modalities:       Fluidotherapy 15 minutes  AROM while in fluidotherapy    Ultrasound  10          minutes  3 MHZ, 1.0 W/cm2,continuous duty cycle to LMF and LRF 5 minutes volar, 5 minutes dorsal surface      Evaluation Complexity: Low      Assessment:  Patient tolerated treatment well, did well with progression this date with adding green therapy ball to use for resistive gripping home exercises.  Slight improvement this date with fine motor coordination.   Patient needs continued  work on/skilled OT for: LUE to address remaining functional, objective and subjective deficits to allow them to return to prior /optimal level of function with ADLs/home tasks  Patient is progressing with goals: increased understanding of home program  Skilled care:  Fluidotherapy, U/S, therapeutic exercise, pt. Education, home program instruction  Pt. Left clinic with all questions answered  Plan:     1.  Patient to improve QuickDash score to at or below 15 for increased independence with ADLs/home tasks.  2.  Patient to improve AROM of left hand WATKINS  to 225 to improve  for increased independence with ADLs/home tasks.  3.  Patient to improve left  strength to 70# to increase use of hand for lifting and carrying tasks.  4. Patient to improve left hand coordination on 9 hole peg test to 35 sec to improve fine motor ADL's  5.  Patient to improve max pain levels to 1/10 with use.   Continue to progress per poc and to improve functional mobility  and strength with decreased symptoms

## 2024-12-09 ENCOUNTER — TREATMENT (OUTPATIENT)
Dept: OCCUPATIONAL THERAPY | Facility: CLINIC | Age: 69
End: 2024-12-09
Payer: MEDICARE

## 2024-12-09 DIAGNOSIS — M25.642 STIFFNESS OF LEFT HAND JOINT: ICD-10-CM

## 2024-12-09 PROCEDURE — 97140 MANUAL THERAPY 1/> REGIONS: CPT | Mod: GO

## 2024-12-09 PROCEDURE — 97022 WHIRLPOOL THERAPY: CPT | Mod: GO

## 2024-12-09 PROCEDURE — 97035 APP MDLTY 1+ULTRASOUND EA 15: CPT | Mod: GO

## 2024-12-09 NOTE — PROGRESS NOTES
Occupational Therapy  Occupational Therapy Progress Note    Patient Name Mino Fernandez   MRN: [id number]  Today's Date: 12/9/2024     Time in: 200 pm  Time out: 245 pm  Total time: 45 minutes    Insurance:    (per information provided by  pre-cert team)  Visit #: 6  Approval required:  N  Medicare certification dates:  10/31/2024-12/12/2024  Medicare re-cert dates: 12/9/2024-1/30/2025    Therapy Diagnoses:   1. Stiffness of left hand joint  Follow Up In Occupational Therapy          General:  Reason for visit: eval and treat for left hand stiffness   Referred by: Dr. Hughes  Script:  OT  Onset Date:  July 2024      Subjective:   Patient reports: Still tight with gripping    Have you fallen since last visit:  N    Precautions: moderate fall risk  none    Pain:  1/10  Location/Type of pain:  left hand ache    HEP compliance/understanding:  compliant    Objective:      R=75   L=60 (was 58)  Lateral pinch R=14   L=14 (was 13)    Treatment:   **= HEP progression today NV= Next visit  np= not performed  nb= non-billable  G= group HEP= discharged to HEP      Therapeutic Exercise:   5    minutes  Review home program    Manual : 15 minutes  -- Retrograde massage, gentle PROM/stretch to left hand    Modalities:       Fluidotherapy 15 minutes  AROM while in fluidotherapy    Ultrasound  10          minutes  3 MHZ, 1.0 W/cm2,continuous duty cycle to LMF and LRF 5 minutes volar, 5 minutes dorsal surface      Evaluation Complexity: Low      Assessment:  Patient tolerated treatment well, did well with progression this date with slight improvement with strength.  Patient needs continued work on/skilled OT for: LUE to address remaining functional, objective and subjective deficits to allow them to return to prior /optimal level of function with ADLs/home tasks  Patient is progressing with goals: increased understanding of home program  Skilled care:  Fluidotherapy, U/S, therapeutic exercise, pt. Education, home program  instruction  Pt. Left clinic with all questions answered  Plan:     1.  Patient to improve QuickDash score to at or below 15 for increased independence with ADLs/home tasks.  2.  Patient to improve AROM of left hand WATKINS  to 225 to improve  for increased independence with ADLs/home tasks.  3.  Patient to improve left  strength to 70# to increase use of hand for lifting and carrying tasks.  4. Patient to improve left hand coordination on 9 hole peg test to 35 sec to improve fine motor ADL's  5.  Patient to improve max pain levels to 1/10 with use.   Continue to progress per poc and to improve functional mobility  and strength with decreased symptoms

## 2024-12-16 ENCOUNTER — TREATMENT (OUTPATIENT)
Dept: OCCUPATIONAL THERAPY | Facility: CLINIC | Age: 69
End: 2024-12-16
Payer: MEDICARE

## 2024-12-16 DIAGNOSIS — M25.642 STIFFNESS OF LEFT HAND JOINT: ICD-10-CM

## 2024-12-16 PROCEDURE — 97035 APP MDLTY 1+ULTRASOUND EA 15: CPT | Mod: GO

## 2024-12-16 PROCEDURE — 97022 WHIRLPOOL THERAPY: CPT | Mod: GO

## 2024-12-16 PROCEDURE — 97140 MANUAL THERAPY 1/> REGIONS: CPT | Mod: GO

## 2024-12-16 NOTE — PROGRESS NOTES
Occupational Therapy  Occupational Therapy Progress Note    Patient Name Mino Fernandez   MRN: [id number]  Today's Date: 12/16/2024     Time in: 200 pm  Time out: 245 pm  Total time: 45 minutes    Insurance:    (per information provided by  pre-cert team)  Visit #: 7  Approval required:  N  Medicare certification dates:  10/31/2024-12/12/2024  Medicare re-cert dates: 12/9/2024-1/30/2025    Therapy Diagnoses:   1. Stiffness of left hand joint  Follow Up In Occupational Therapy          General:  Reason for visit: eval and treat for left hand stiffness   Referred by: Dr. Hughes  Script:  OT  Onset Date:  July 2024      Subjective:   Patient reports: I am more flexible with making a fist    Have you fallen since last visit:  N    Precautions: moderate fall risk  none    Pain:  1/10  Location/Type of pain:  left hand ache    HEP compliance/understanding:  compliant    Objective:    Improved ability to make a fist  Slight increased edema in ring finger this date  Treatment:   **= HEP progression today NV= Next visit  np= not performed  nb= non-billable  G= group HEP= discharged to HEP      Therapeutic Exercise:   5    minutes  Review home program, issued blue therapy ball and green therapy ball is less challenging.     Manual : 15 minutes  -- Retrograde massage, gentle PROM/stretch to left hand    Modalities:       Fluidotherapy 15 minutes  AROM while in fluidotherapy    Ultrasound  10          minutes  3 MHZ, 1.0 W/cm2,continuous duty cycle to LMF and LRF 5 minutes volar, 5 minutes dorsal surface      Evaluation Complexity: Low      Assessment:  Patient tolerated treatment well, did well with progression this date with improved  strength secondary to pt. Verbalizing decreased challenge with green therapy ball.  Issued next level resistive blue therapy ball.   Patient needs continued work on/skilled OT for: LUE to address remaining functional, objective and subjective deficits to allow them to return to prior  /optimal level of function with ADLs/home tasks  Patient is progressing with goals: increased understanding of home program  Skilled care:  Fluidotherapy, U/S, therapeutic exercise, pt. Education, home program instruction  Pt. Left clinic with all questions answered  Plan:     1.  Patient to improve QuickDash score to at or below 15 for increased independence with ADLs/home tasks.  2.  Patient to improve AROM of left hand WATKINS  to 225 to improve  for increased independence with ADLs/home tasks.  3.  Patient to improve left  strength to 70# to increase use of hand for lifting and carrying tasks.  4. Patient to improve left hand coordination on 9 hole peg test to 35 sec to improve fine motor ADL's  5.  Patient to improve max pain levels to 1/10 with use.   Continue to progress per poc and to improve functional mobility  and strength with decreased symptoms

## 2024-12-23 ENCOUNTER — TREATMENT (OUTPATIENT)
Dept: OCCUPATIONAL THERAPY | Facility: CLINIC | Age: 69
End: 2024-12-23
Payer: MEDICARE

## 2024-12-23 DIAGNOSIS — M25.642 STIFFNESS OF LEFT HAND JOINT: ICD-10-CM

## 2024-12-23 PROCEDURE — 97035 APP MDLTY 1+ULTRASOUND EA 15: CPT | Mod: GO

## 2024-12-23 PROCEDURE — 97140 MANUAL THERAPY 1/> REGIONS: CPT | Mod: GO

## 2024-12-23 PROCEDURE — 97022 WHIRLPOOL THERAPY: CPT | Mod: GO

## 2024-12-23 NOTE — PROGRESS NOTES
Occupational Therapy  Occupational Therapy Progress Note    Patient Name Mino Fernandez   MRN: [id number]  Today's Date: 12/23/2024     Time in: 200 pm  Time out: 245 pm  Total time: 45 minutes    Insurance:    (per information provided by  pre-cert team)  Visit #: 8  Approval required:  N  Medicare certification dates:  10/31/2024-12/12/2024  Medicare re-cert dates: 12/9/2024-1/30/2025    Therapy Diagnoses:   1. Stiffness of left hand joint  Follow Up In Occupational Therapy          General:  Reason for visit: eval and treat for left hand stiffness   Referred by: Dr. Hughes  Script:  OT  Onset Date:  July 2024      Subjective:   Patient reports: I am doing better with the hand and I am able to make a better fist    Have you fallen since last visit:  N    Precautions: moderate fall risk  none    Pain:  1/10  Location/Type of pain:  left hand ache    HEP compliance/understanding:  compliant    Objective:     R=85    L=58 (was 60)  Treatment:   **= HEP progression today NV= Next visit  np= not performed  nb= non-billable  G= group HEP= discharged to [facility]      Therapeutic Exercise:   5    minutes  Review home program    Manual : 15 minutes  -- Retrograde massage, gentle PROM/stretch to left hand    Modalities:       Fluidotherapy 15 minutes  AROM while in fluidotherapy    Ultrasound  10          minutes  3 MHZ, 1.0 W/cm2,continuous duty cycle to LMF and LRF 5 minutes volar, 5 minutes dorsal surface      Evaluation Complexity: Low      Assessment:  Patient tolerated treatment well, did well with progression this date with improved flexibility in making a fist.  Pt. Is discharged from O.T. at this time and encouraged to continue efforts with home program.  Pt. Is in agreement with discharge.  Skilled care:  Fluidotherapy, U/S, therapeutic exercise, pt. Education, home program instruction  Pt. Left clinic with all questions answered    Plan:     1.  Patient to improve QuickDash score to at or below 15 for increased  independence with ADLs/home tasks. Not achieved, however, pt. Verbalized improved ability to use his hand with activity.  2.  Patient to improve AROM of left hand WATKINS  to 225 to improve  for increased independence with ADLs/home tasks. Progressing and will be ongoing with home program  3.  Patient to improve left  strength to 70# to increase use of hand for lifting and carrying tasks. Not achieved, however, pt. Verbalized improved ability with using hand with improved strength.  Pt. Will continue efforts with home program  4. Patient to improve left hand coordination on 9 hole peg test to 35 sec to improve fine motor ADL's Improved report of fine motor ability with daily tasks.   5.  Patient to improve max pain levels to 1/10 with use. Achieved    Discharge from O.T. and continue efforts with home program.

## 2025-01-06 ENCOUNTER — APPOINTMENT (OUTPATIENT)
Dept: OCCUPATIONAL THERAPY | Facility: CLINIC | Age: 70
End: 2025-01-06
Payer: MEDICARE

## 2025-03-13 ENCOUNTER — APPOINTMENT (OUTPATIENT)
Dept: PRIMARY CARE | Facility: CLINIC | Age: 70
End: 2025-03-13
Payer: MEDICARE

## 2025-03-14 ENCOUNTER — APPOINTMENT (OUTPATIENT)
Dept: PRIMARY CARE | Facility: CLINIC | Age: 70
End: 2025-03-14
Payer: MEDICARE

## 2025-04-02 ENCOUNTER — APPOINTMENT (OUTPATIENT)
Dept: NEUROLOGY | Facility: CLINIC | Age: 70
End: 2025-04-02
Payer: MEDICARE

## 2025-04-08 ENCOUNTER — APPOINTMENT (OUTPATIENT)
Dept: PRIMARY CARE | Facility: CLINIC | Age: 70
End: 2025-04-08
Payer: MEDICARE

## 2025-04-08 ENCOUNTER — HOSPITAL ENCOUNTER (OUTPATIENT)
Dept: VASCULAR MEDICINE | Facility: CLINIC | Age: 70
Discharge: HOME | End: 2025-04-08
Payer: MEDICARE

## 2025-04-08 VITALS
WEIGHT: 146 LBS | SYSTOLIC BLOOD PRESSURE: 134 MMHG | HEART RATE: 92 BPM | HEIGHT: 71 IN | BODY MASS INDEX: 20.44 KG/M2 | DIASTOLIC BLOOD PRESSURE: 76 MMHG

## 2025-04-08 DIAGNOSIS — R03.0 PREHYPERTENSION: ICD-10-CM

## 2025-04-08 DIAGNOSIS — H40.9 GLAUCOMA, UNSPECIFIED GLAUCOMA TYPE, UNSPECIFIED LATERALITY: ICD-10-CM

## 2025-04-08 DIAGNOSIS — G70.9 NEUROMUSCULAR DISEASE (MULTI): ICD-10-CM

## 2025-04-08 DIAGNOSIS — R60.9 EDEMA, UNSPECIFIED TYPE: ICD-10-CM

## 2025-04-08 DIAGNOSIS — I10 WHITE COAT SYNDROME WITH HYPERTENSION: ICD-10-CM

## 2025-04-08 DIAGNOSIS — D64.9 ANEMIA, UNSPECIFIED TYPE: Primary | ICD-10-CM

## 2025-04-08 DIAGNOSIS — G20.A1 PARKINSON'S DISEASE, UNSPECIFIED WHETHER DYSKINESIA PRESENT, UNSPECIFIED WHETHER MANIFESTATIONS FLUCTUATE: ICD-10-CM

## 2025-04-08 DIAGNOSIS — E78.2 HYPERLIPEMIA, MIXED: ICD-10-CM

## 2025-04-08 DIAGNOSIS — R60.0 LOCALIZED EDEMA: ICD-10-CM

## 2025-04-08 DIAGNOSIS — Z12.5 SCREENING FOR PROSTATE CANCER: ICD-10-CM

## 2025-04-08 PROCEDURE — 1036F TOBACCO NON-USER: CPT | Performed by: INTERNAL MEDICINE

## 2025-04-08 PROCEDURE — 3078F DIAST BP <80 MM HG: CPT | Performed by: INTERNAL MEDICINE

## 2025-04-08 PROCEDURE — 3008F BODY MASS INDEX DOCD: CPT | Performed by: INTERNAL MEDICINE

## 2025-04-08 PROCEDURE — 99214 OFFICE O/P EST MOD 30 MIN: CPT | Performed by: INTERNAL MEDICINE

## 2025-04-08 PROCEDURE — 93970 EXTREMITY STUDY: CPT | Performed by: SURGERY

## 2025-04-08 PROCEDURE — 1159F MED LIST DOCD IN RCRD: CPT | Performed by: INTERNAL MEDICINE

## 2025-04-08 PROCEDURE — G0439 PPPS, SUBSEQ VISIT: HCPCS | Performed by: INTERNAL MEDICINE

## 2025-04-08 PROCEDURE — 3075F SYST BP GE 130 - 139MM HG: CPT | Performed by: INTERNAL MEDICINE

## 2025-04-08 PROCEDURE — 93970 EXTREMITY STUDY: CPT

## 2025-04-08 PROCEDURE — 1160F RVW MEDS BY RX/DR IN RCRD: CPT | Performed by: INTERNAL MEDICINE

## 2025-04-08 PROCEDURE — 1124F ACP DISCUSS-NO DSCNMKR DOCD: CPT | Performed by: INTERNAL MEDICINE

## 2025-04-08 PROCEDURE — 1170F FXNL STATUS ASSESSED: CPT | Performed by: INTERNAL MEDICINE

## 2025-04-08 ASSESSMENT — ENCOUNTER SYMPTOMS
JOINT SWELLING: 0
DIARRHEA: 0
SEIZURES: 0
FLANK PAIN: 0
DIZZINESS: 0
EYE PAIN: 0
SINUS PRESSURE: 0
LIGHT-HEADEDNESS: 0
ANAL BLEEDING: 0
EYE ITCHING: 0
TREMORS: 1
POLYPHAGIA: 0
DYSURIA: 0
TROUBLE SWALLOWING: 0
NUMBNESS: 0
COUGH: 0
SORE THROAT: 0
NECK PAIN: 0
EYE DISCHARGE: 0
ABDOMINAL DISTENTION: 0
SINUS PAIN: 0
ACTIVITY CHANGE: 0
RHINORRHEA: 0
PHOTOPHOBIA: 0
DIAPHORESIS: 0
POLYDIPSIA: 0
NECK STIFFNESS: 0
WHEEZING: 0
STRIDOR: 0
RECTAL PAIN: 0
SHORTNESS OF BREATH: 0
VOICE CHANGE: 0
HEADACHES: 0
CHEST TIGHTNESS: 0
ARTHRALGIAS: 1
BLOOD IN STOOL: 0
FREQUENCY: 0
CHILLS: 0
FACIAL SWELLING: 0
ADENOPATHY: 0
COLOR CHANGE: 0
CONSTIPATION: 0
NAUSEA: 0
DIFFICULTY URINATING: 0
ABDOMINAL PAIN: 0
BRUISES/BLEEDS EASILY: 0
FACIAL ASYMMETRY: 0
PALPITATIONS: 0
MYALGIAS: 0
EYE REDNESS: 0
BACK PAIN: 0
SPEECH DIFFICULTY: 0
CHOKING: 0
APPETITE CHANGE: 0
WOUND: 0
WEAKNESS: 0
SLEEP DISTURBANCE: 0
FATIGUE: 0
HEMATURIA: 0
VOMITING: 0

## 2025-04-08 ASSESSMENT — ACTIVITIES OF DAILY LIVING (ADL)
TAKING_MEDICATION: INDEPENDENT
DOING_HOUSEWORK: INDEPENDENT
DRESSING: INDEPENDENT
MANAGING_FINANCES: INDEPENDENT
BATHING: INDEPENDENT
GROCERY_SHOPPING: INDEPENDENT

## 2025-04-08 NOTE — PROGRESS NOTES
Subjective   Patient ID: Mino Fernandez is a 69 y.o. male who presents for Medicare Annual Wellness Visit Subsequent.    Patient presents for wellness exam and follow-up.  He has been compliant with his medications, diet and exercise.  He reports his Parkinson symptoms are stable.  He has been complaining of left lower leg pain and swelling over the past few months.  He denies any history of trauma.  He otherwise feels okay.  He denies any headaches, no dizziness, but does complain of allergy symptoms.  He denies any chest pain or shortness of breath, no abdominal pain no nausea vomiting or diarrhea.  He reports no other new musculoskeletal complaints.         Review of Systems   Constitutional:  Negative for activity change, appetite change, chills, diaphoresis and fatigue.   HENT:  Negative for congestion, dental problem, drooling, ear discharge, ear pain, facial swelling, hearing loss, mouth sores, nosebleeds, postnasal drip, rhinorrhea, sinus pressure, sinus pain, sneezing, sore throat, tinnitus, trouble swallowing and voice change.    Eyes:  Negative for photophobia, pain, discharge, redness, itching and visual disturbance.   Respiratory:  Negative for cough, choking, chest tightness, shortness of breath, wheezing and stridor.    Cardiovascular:  Positive for leg swelling. Negative for chest pain and palpitations.   Gastrointestinal:  Negative for abdominal distention, abdominal pain, anal bleeding, blood in stool, constipation, diarrhea, nausea, rectal pain and vomiting.   Endocrine: Negative for cold intolerance, heat intolerance, polydipsia, polyphagia and polyuria.   Genitourinary:  Negative for decreased urine volume, difficulty urinating, dysuria, enuresis, flank pain, frequency, genital sores, hematuria and urgency.   Musculoskeletal:  Positive for arthralgias. Negative for back pain, gait problem, joint swelling, myalgias, neck pain and neck stiffness.   Skin:  Negative for color change, pallor, rash  "and wound.   Neurological:  Positive for tremors. Negative for dizziness, seizures, syncope, facial asymmetry, speech difficulty, weakness, light-headedness, numbness and headaches.   Hematological:  Negative for adenopathy. Does not bruise/bleed easily.   Psychiatric/Behavioral:  Negative for sleep disturbance.        Objective   /76   Pulse 92   Ht 1.803 m (5' 11\")   Wt 66.2 kg (146 lb)   BMI 20.36 kg/m²     Physical Exam  Constitutional:       Appearance: Normal appearance.   Cardiovascular:      Rate and Rhythm: Normal rate and regular rhythm.      Heart sounds: No murmur heard.     No gallop.   Pulmonary:      Effort: No respiratory distress.      Breath sounds: No wheezing or rales.   Abdominal:      General: There is no distension.      Palpations: There is no mass.      Tenderness: There is no abdominal tenderness. There is no guarding.   Musculoskeletal:      Right lower leg: No edema.      Left lower leg: No edema (1+ edema).   Neurological:      Mental Status: He is alert.         Assessment/Plan   Diagnoses and all orders for this visit:  Anemia, unspecified type-recheck CBC  -     CBC and Auto Differential; Future  Parkinson's disease, unspecified whether dyskinesia present, unspecified whether [id number]-[id number]-jf with neurology  Neuromuscular disease (Multi)-follow-up with neurology  Prehypertension-blood pressure is lower at home.  -     Basic Metabolic Panel; Future  Edema, unspecified type  -     Vascular US lower extremity venous duplex bilateral; Future  Localized edema-will obtain ultrasound of lower extremity.  -     Vascular US lower extremity venous duplex bilateral; Future  Hyperlipemia, mixed-will continue with diet and exercise  White coat syndrome with hypertension-blood pressures improved  Glaucoma, unspecified glaucoma type, unspecified qegtrttiqy-zdcboz-xf with ophthalmology  Screening for prostate cancer  -     Prostate Specific Antigen, Screen; Future  Elevated PSA-will " recheck.  He will schedule follow-up appointment with urology.  Health maintenance-he will get a tetanus shot at the pharmacy.  Colonoscopy is up-to-date.  Eye appointment has been done.  Will schedule dental appointment.

## 2025-04-08 NOTE — PATIENT INSTRUCTIONS
Please obtain an ultrasound.  Follow-up for complete physical exam.  Schedule appointment with urology

## 2025-04-10 DIAGNOSIS — R60.9 EDEMA, UNSPECIFIED TYPE: Primary | ICD-10-CM

## 2025-04-15 ENCOUNTER — APPOINTMENT (OUTPATIENT)
Dept: NEUROLOGY | Facility: CLINIC | Age: 70
End: 2025-04-15
Payer: MEDICARE

## 2025-04-15 ENCOUNTER — OFFICE VISIT (OUTPATIENT)
Dept: VASCULAR SURGERY | Facility: CLINIC | Age: 70
End: 2025-04-15
Payer: MEDICARE

## 2025-04-15 VITALS
SYSTOLIC BLOOD PRESSURE: 120 MMHG | BODY MASS INDEX: 20.72 KG/M2 | DIASTOLIC BLOOD PRESSURE: 70 MMHG | HEART RATE: 75 BPM | WEIGHT: 148 LBS | OXYGEN SATURATION: 100 % | HEIGHT: 71 IN

## 2025-04-15 DIAGNOSIS — R60.0 BILATERAL LEG EDEMA: Primary | ICD-10-CM

## 2025-04-15 DIAGNOSIS — R60.9 EDEMA, UNSPECIFIED TYPE: ICD-10-CM

## 2025-04-15 PROCEDURE — 1036F TOBACCO NON-USER: CPT | Performed by: SURGERY

## 2025-04-15 PROCEDURE — 3008F BODY MASS INDEX DOCD: CPT | Performed by: SURGERY

## 2025-04-15 PROCEDURE — 1159F MED LIST DOCD IN RCRD: CPT | Performed by: SURGERY

## 2025-04-15 PROCEDURE — 1160F RVW MEDS BY RX/DR IN RCRD: CPT | Performed by: SURGERY

## 2025-04-15 PROCEDURE — 3074F SYST BP LT 130 MM HG: CPT | Performed by: SURGERY

## 2025-04-15 PROCEDURE — 3078F DIAST BP <80 MM HG: CPT | Performed by: SURGERY

## 2025-04-15 PROCEDURE — 99204 OFFICE O/P NEW MOD 45 MIN: CPT | Performed by: SURGERY

## 2025-04-15 ASSESSMENT — ENCOUNTER SYMPTOMS
PSYCHIATRIC NEGATIVE: 1
ENDOCRINE NEGATIVE: 1
NEUROLOGICAL NEGATIVE: 1
HEMATOLOGIC/LYMPHATIC NEGATIVE: 1
ALLERGIC/IMMUNOLOGIC NEGATIVE: 1
MUSCULOSKELETAL NEGATIVE: 1
CONSTITUTIONAL NEGATIVE: 1
RESPIRATORY NEGATIVE: 1
GASTROINTESTINAL NEGATIVE: 1

## 2025-04-15 NOTE — PROGRESS NOTES
History Of Present Illness  Mino Fernandez is a 69 y.o. male presenting with complaint to rule lower extremity swelling, L>R, for the past 5 months.  He states he noticed the swelling most after a long period of exercise, particularly salsa dancing.  He notes that after exercise he will also notice some soreness in his shin.  He denies any particular improvement.  He denies varicose veins.  He denies history of DVT or any other this disorders.  Medical history includes Parkinson disease for which he takes carbidopa levodopa.  He has never been a smoker.     Past Medical History  He has a past medical history of Asthma, Glaucoma, and Other specified health status.    Surgical History  He has a past surgical history that includes Colonoscopy (09/24/2013); Knee surgery (09/24/2013); Hernia repair (09/24/2013); Other surgical history (07/15/2020); and Colonoscopy (09/2023).     Social History  He reports that he has never smoked. He has never used smokeless tobacco. He reports that he does not drink alcohol and does not use drugs.    Family History  Family History   Problem Relation Name Age of Onset    Lung cancer Mother      Stroke Father          Allergies  Alcohol, Banana, Cherry, and Pollen extracts    Review of Systems   Constitutional: Negative.    HENT: Negative.     Respiratory: Negative.     Cardiovascular:  Positive for leg swelling.   Gastrointestinal: Negative.    Endocrine: Negative.    Genitourinary: Negative.    Musculoskeletal: Negative.    Allergic/Immunologic: Negative.    Neurological: Negative.    Hematological: Negative.    Psychiatric/Behavioral: Negative.          Physical Exam  Vitals reviewed.   Constitutional:       General: He is not in acute distress.     Appearance: Normal appearance. He is normal weight.   HENT:      Head: Normocephalic and atraumatic.   Eyes:      Extraocular Movements: Extraocular movements intact.      Conjunctiva/sclera: Conjunctivae normal.   Cardiovascular:      Rate  "and Rhythm: Normal rate and regular rhythm.      Pulses: Normal pulses.           Femoral pulses are 2+ on the right side and 2+ on the left side.       Popliteal pulses are 2+ on the right side and 2+ on the left side.        Dorsalis pedis pulses are 2+ on the right side and 2+ on the left side.        Posterior tibial pulses are 2+ on the right side and 2+ on the left side.      Heart sounds: Normal heart sounds.      Comments: Trace edema of RLE  1+ edema of LLE  Pulmonary:      Effort: Pulmonary effort is normal.      Breath sounds: Normal breath sounds.   Abdominal:      Palpations: Abdomen is soft.   Musculoskeletal:         General: No swelling or tenderness. Normal range of motion.      Cervical back: Normal range of motion.      Right lower leg: Edema present.      Left lower le+ Edema present.   Skin:     General: Skin is warm and dry.      Capillary Refill: Capillary refill takes less than 2 seconds.   Neurological:      General: No focal deficit present.      Mental Status: He is alert and oriented to person, place, and time.      Cranial Nerves: No cranial nerve deficit.      Sensory: No sensory deficit.      Motor: No weakness.   Psychiatric:         Mood and Affect: Mood normal.         Behavior: Behavior normal.          Last Recorded Vitals  Blood pressure 120/70, pulse 75, height 1.803 m (5' 11\"), weight 67.1 kg (148 lb), SpO2 100%.    Relevant Results      Current Outpatient Medications:     albuterol 90 mcg/actuation inhaler, USE 1 TO 2 INHALATIONS     ORALLY EVERY 4 TO 6 HOURS  AS NEEDED, Disp: , Rfl:     ascorbic acid (Vitamin C) 250 mg tablet, Take by mouth., Disp: , Rfl:     azelastine (Astelin) 137 mcg (0.1 %) nasal spray, Administer 1 spray into each nostril., Disp: , Rfl:     CALCIUM CITRATE-VITAMIN D3 ORAL, Take by mouth., Disp: , Rfl:     carbidopa-levodopa (Sinemet)  mg tablet, Take 1 tablet by mouth 3 times a day. WEEK 1: take 0.5 tab (half) tab 3 times a day. WEEK 2: take 1 " tab 3 times a day and stay at this dose., Disp: 90 tablet, Rfl: 11    cholecalciferol (Vitamin D-3) 50 MCG (2000 UT) tablet, Take by mouth., Disp: , Rfl:     cyanocobalamin, vitamin B-12, (VITAMIN B-12 ORAL), Take by mouth., Disp: , Rfl:     dorzolamide-timoloL (Cosopt) 22.3-6.8 mg/mL ophthalmic solution, Administer 1 drop into both eyes 2 times a day., Disp: , Rfl:     fluticasone (Flonase) 50 mcg/actuation nasal spray, Administer 2 sprays into each nostril once daily., Disp: , Rfl:     ketoconazole (NIZOral) 2 % shampoo, Apply topically 3 (three) times a week., Disp: 360 mL, Rfl: 6    latanoprost (Xalatan) 0.005 % ophthalmic solution, Administer into affected eye(s)., Disp: , Rfl:     multivitamin tablet, Take by mouth., Disp: , Rfl:     POTASSIUM ORAL, Take by mouth., Disp: , Rfl:     rasagiline (Azilect) 1 mg tablet, Take half tablet daily first week then take one tablet daily, Disp: 90 tablet, Rfl: 3       Vascular US lower extremity venous duplex bilateral    Result Date: 4/9/2025             [place] [address]  Tel [phone] and Fax [phone]  Vascular Lab Report VASC US LOWER EXTREMITY VENOUS DUPLEX BILATERAL  Patient Name:      RUBEN Crawford Physician:  [id number] Viri Ha MD Study Date:        4/8/2025             Ordering Physician: [id number] PEDERSON MRN/PID:           [id number]             Technologist:       Tania Ortega RVT Accession#:        [id number]         Technologist 2: Date of Birth/Age: 1955 / 69 years Encounter#:         [id number] Gender:            M Admission Status:  Outpatient           Location Performed: [facility]  Diagnosis/ICD: Localized (leg) edema-R60.0 Indication:    Limb swelling CPT Codes:     44493 Peripheral venous duplex scan for DVT complete  CONCLUSIONS: Right Lower Venous: No evidence of acute deep vein thrombus visualized  in the right lower extremity. Left Lower Venous: No evidence of acute deep vein thrombus visualized in the left lower extremity.  Imaging & Doppler Findings:  Right                 Compressible Thrombus        Flow Distal External Iliac                       Spontaneous/Phasic CFV                       Yes        None   Spontaneous/Phasic PFV                       Yes        None FV Proximal               Yes        None   Spontaneous/Phasic FV Mid                    Yes        None FV Distal                 Yes        None Popliteal                 Yes        None   Spontaneous/Phasic Peroneal                  Yes        None PTV                       Yes        None  Left                  Compress Thrombus        Flow Distal External Iliac                   Spontaneous/Phasic CFV                     Yes      None   Spontaneous/Phasic PFV                     Yes      None FV Proximal             Yes      None   Spontaneous/Phasic FV Mid                  Yes      None FV Distal               Yes      None Popliteal               Yes      None   Spontaneous/Phasic Peroneal                Yes      None PTV                     Yes      None  [id number] Viri Ha MD Electronically signed by [id number] Viri Ha MD on 4/9/2025 at 10:16:29 PM  ** Final **            Assessment/Plan   Diagnoses and all orders for this visit:  Bilateral leg edema  Edema, unspecified type  -     Referral to Vascular Surgery      68yo male with bilateral lower extremity edema.  The edema is mild and slightly worse on the left.  Recent venous duplex is negative for DVT, and he denies any history of DVT in the past.  His swelling appears to be consistent with intense exercising, and I do feel this more musculoskeletal in nature but vascular.  I have recommended that he wear compression stockings during these periods of exercise and dancing to help counteract the swelling.  I have also recommended that he elevate his legs while at rest.   Will have him follow-up in 1 month to reevaluate.       (This note was generated with voice recognition software and may contain errors including spelling, grammar, syntax and missed recognition of what was dictated, of which may not have been fully corrected)        Viri Ha MD

## 2025-04-23 ENCOUNTER — APPOINTMENT (OUTPATIENT)
Dept: NEUROLOGY | Facility: CLINIC | Age: 70
End: 2025-04-23
Payer: MEDICARE

## 2025-04-23 VITALS
WEIGHT: 146 LBS | BODY MASS INDEX: 20.36 KG/M2 | HEART RATE: 96 BPM | SYSTOLIC BLOOD PRESSURE: 106 MMHG | DIASTOLIC BLOOD PRESSURE: 70 MMHG | RESPIRATION RATE: 16 BRPM

## 2025-04-23 DIAGNOSIS — G20.A1 PARKINSON'S DISEASE WITHOUT DYSKINESIA OR FLUCTUATING MANIFESTATIONS: Primary | ICD-10-CM

## 2025-04-23 DIAGNOSIS — R13.10 DYSPHAGIA, UNSPECIFIED TYPE: ICD-10-CM

## 2025-04-23 DIAGNOSIS — R49.8 HYPOPHONIA: ICD-10-CM

## 2025-04-23 PROCEDURE — 3078F DIAST BP <80 MM HG: CPT | Performed by: NURSE PRACTITIONER

## 2025-04-23 PROCEDURE — 99212 OFFICE O/P EST SF 10 MIN: CPT | Performed by: NURSE PRACTITIONER

## 2025-04-23 PROCEDURE — 1159F MED LIST DOCD IN RCRD: CPT | Performed by: NURSE PRACTITIONER

## 2025-04-23 PROCEDURE — 3074F SYST BP LT 130 MM HG: CPT | Performed by: NURSE PRACTITIONER

## 2025-04-23 PROCEDURE — 1036F TOBACCO NON-USER: CPT | Performed by: NURSE PRACTITIONER

## 2025-04-23 PROCEDURE — 1160F RVW MEDS BY RX/DR IN RCRD: CPT | Performed by: NURSE PRACTITIONER

## 2025-04-23 ASSESSMENT — UNIFIED PARKINSONS DISEASE RATING SCALE (UPDRS)
AMPLITUDE_LIP_JAW: 1
TOETAPPING_LEFT: 3
AMPLITUDE_RUE: 0
KINETIC_TREMOR_LEFTHAND: 1
POSTURAL_TREMOR_RIGHTHAND: 1
TOTAL_SCORE: 46
DYSKINESIAS_PRESENT: NO
FINGER_TAPPING_RIGHT: 2
PRONATION_SUPINATION_RIGHT: 1
LEG_AGILITY_RIGHT: 0
KINETIC_TREMOR_RIGHTHAND: 1
FACIAL_EXPRESSION: 2
MINUTES_SINCE_LEVODOPA: 2HRS
CLINICAL_STATE: ON
AMPLITUDE_RLE: 0
RIGIDITY_LUE: 2
LEVODOPA: YES
CONSTANCY_TREMOR_ATREST: 4
PRONATION_SUPINATION_LEFT: 3
LEG_AGILITY_LEFT: 1
POSTURE: 1
POSTURAL_TREMOR_LEFTHAND: 1
PARKINSONS_MEDS: YES
GAIT: 1
SPONTANEITY_OF_MOVEMENT: 3
POSTURAL_STABILITY: 0
AMPLITUDE_LUE: 1
CHAIR_RISING_SCALE: 0
AMPLITUDE_LLE: 0
RIGIDITY_NECK: 2
TOETAPPING_RIGHT: 1
RIGIDITY_RLE: 1
FREEZING_GAIT: 0
HANDMOVEMENTS_RIGHT: 2
RIGIDITY_LLE: 1
SPEECH: 2
RIGIDITY_RUE: 2
FINGER_TAPPING_LEFT: 3

## 2025-04-23 ASSESSMENT — ENCOUNTER SYMPTOMS
OCCASIONAL FEELINGS OF UNSTEADINESS: 0
LOSS OF SENSATION IN FEET: 0
DEPRESSION: 0

## 2025-04-23 NOTE — PROGRESS NOTES
Subjective     Mino Fernandez is a 69 y.o. year old male who presents with Parkinson's Disease, here for follow up visit.    HPI  Last visit 10/9/24 with Dr. Vance  It was a pleasure seeing you today for Parkinson's disease.  We discussed carbidopa/levodopa.  Please make a follow up appointment at the  or by calling the office in 4 months.         Sinemet helped mildly with stiffness in fingers.    MOTOR SYMPTOMS      +/-                            Comments  Motor sx overall  Worse tremor   Tremor + Early am and late night tremor is worse   Rigidity -    Bradykinesia -    Balance/gait -    FOG -    Falls -    PT -    Exercise + Dancing, christiano  Was doing boxing but stopped     NON MOTOR SYMPTOMS       +/-                               Comments  Orthostatic lightheadedness  -    Cognitive -    Insomnia -    RBD -    Depression -    Anxiety -    Fatigue -    Sialorrhea + Declines inj   Hypophonia + Some  ST   Dysphagia + Once in a while with capsules but no coughing or choking w/ eating/drinking   Constipation -    Urinary dysfunction -         Social  Lives with:   Help with ADLs:          [facility] Meds  Med Dose Time   Carbidopa-levodopa 25/100mg 1 tab 3 times a day  [id number]  Bed is 12am   Rasagiline 1mg 1 tab daily         Latency none    Wearing off none    Side effects     Dyskinesia None    Hallucinations None    Other None      Prior medications:    Pertinent testing:      Patient Health Questionnaire-2 Score: 0          Current Medications[1]       Objective   Vitals:    04/23/25 1348 04/23/25 1351   BP: 123/78 106/70   BP Location: Left arm Left arm   Patient Position: Sitting Standing   BP Cuff Size: Adult Adult   Pulse: 79 96   Resp: 16    Weight: 66.2 kg (146 lb)                  Physical Exam    MDS UPDRS 1st Score: Motor Examination  Is the patient on medication for treating the symptoms of Parkinson's Disease?: Yes  Patients receiving medication for treating the  symptoms of Parkinson's Disease, june the patient's clinical state.: On  Is the patient on Levodopa?: Yes  Minutes since last Levodopa dose: 2hrs  Speech: 2  Facial Expression: 2  Rigidty Neck: 2  Rigidty RUE: 2  Rigidity - LUE: 2  Rigidity RLE: 1  Rigidity LLE: 1  Finger Tapping Right Hand: 2  Finger Tapping Left Hand: 3  Hand Movements- Right Hand: 2  Hand Movements- Left Hand: 3  Pronatiaon-Supination Movments - Right Hand: 1  Pronatiaon-Supination Movments Left Hand: 3  Toe Tapping Right Foot: 1  Toe Tapping - Left Foot: 3  Leg Agility - Right Le  Leg Agility - Left le  Arising from Chair: 0  Gait: 1  Freezing of Gait: 0  Postural Stability: 0  Posture: 1  Global Spontanteity of Movment ( Body Bradykinesia): 3  Postural Tremor - Right Hand: 1  Postural Tremor - Left hand: 1  Kinetic Tremor - Right hand: 1  Kinetic Tremor - Left hand: 1  Rest Tremor Amplitude - RUE: 0  Rest Tremor Amplitude - LUE: 1  Rest Tremor Amplitude - RLE: 0  Rest Tremor Amplitude - LLE: 0  Rest Tremor Amplitude - Lip/Jaw: 1  Constancy of Rest Tremor: 4  MDS UPDRS Total Score: 46  Were dyskinesias (chorea or dystonia) present during examination?: No        Assessment/Plan   Mino Fernandez is a right handed  69 y.o.  male who presents with Parkinson's disease, initially diagnosed 2016. Appears to be tremor predominant subtype with relatively mild progression since 2016.   Since last visit he started Sinemet, at 1 tab TID minor benefit such as more flexibility in fingers but not much else. He feels no limitations and does not want to increase the Sinemet despite not much benefit and no SE unless he cannot do things which we discussed today as he is undertreated, has striatal hands begining,and increasing for easier mobility/quality of life and to treat so no worsening of hands. He declines.     LSVT c/s for hypophonia and mild dysphagia (pills only).       Diagnoses and all orders for this visit:  Parkinson's disease without  dyskinesia or fluctuating manifestations  -     Referral to Speech Therapy; Future  -     Referral to ENT; Future  Hypophonia  -     Referral to Speech Therapy; Future  -     Referral to ENT; Future  Dysphagia, unspecified type  -     Referral to Speech Therapy; Future  -     Referral to ENT; Future      #Continue Carbidopa-levodopa 25/100mg 1 tab 3 times a day    Let me know if motor symptoms become bothersome (tremor, stiffness, slowness, walking)     #Consult ENT and LSVT LOU[facility] Parkinson's speech therapy    #Keep up the great work with exercise, hopefully you can get back into boxing.     #Follow up in 6M with Dr. Rajiv Mccray, NP-C  Adult/Gerontological Nurse Practitioner   [facility], Department of Neurology  [facility] [place]  [facility]  [address] [place] [address]  Phone: [phone]  Fax: [phone]         [1]   Current Outpatient Medications:     albuterol 90 mcg/actuation inhaler, USE 1 TO 2 INHALATIONS     ORALLY EVERY 4 TO 6 HOURS  AS NEEDED, Disp: , Rfl:     ascorbic acid (Vitamin C) 250 mg tablet, Take by mouth., Disp: , Rfl:     azelastine (Astelin) 137 mcg (0.1 %) nasal spray, Administer 1 spray into each nostril., Disp: , Rfl:     CALCIUM CITRATE-VITAMIN D3 ORAL, Take by mouth., Disp: , Rfl:     carbidopa-levodopa (Sinemet)  mg tablet, Take 1 tablet by mouth 3 times a day. WEEK 1: take 0.5 tab (half) tab 3 times a day. WEEK 2: take 1 tab 3 times a day and stay at this dose., Disp: 90 tablet, Rfl: 11    cholecalciferol (Vitamin D-3) 50 MCG (2000 UT) tablet, Take by mouth., Disp: , Rfl:     cyanocobalamin, vitamin B-12, (VITAMIN B-12 ORAL), Take by mouth., Disp: , Rfl:     dorzolamide-timoloL (Cosopt) 22.3-6.8 mg/mL ophthalmic solution, Administer 1 drop into both eyes 2 times a day., Disp: , Rfl:     fluticasone (Flonase) 50 mcg/actuation nasal spray, Administer 2 sprays into each nostril once daily., Disp: ,  Rfl:     ketoconazole (NIZOral) 2 % shampoo, Apply topically 3 (three) times a week., Disp: 360 mL, Rfl: 6    latanoprost (Xalatan) 0.005 % ophthalmic solution, Administer into affected eye(s)., Disp: , Rfl:     multivitamin tablet, Take by mouth., Disp: , Rfl:     POTASSIUM ORAL, Take by mouth., Disp: , Rfl:     rasagiline (Azilect) 1 mg tablet, Take half tablet daily first week then take one tablet daily, Disp: 90 tablet, Rfl: 3

## 2025-04-23 NOTE — PATIENT INSTRUCTIONS
#Continue Carbidopa-levodopa 25/100mg 1 tab 3 times a day    Let me know if motor symptoms become bothersome (tremor, stiffness, slowness, walking)     #Consult ENT and [facility] Parkinson's speech therapy    #Keep up the great work with exercise, hopefully you can get back into boxing.     #Follow up in 6M with Dr. Rajiv Mccray, NP-C  Adult/Gerontological Nurse Practitioner   [facility], Department of Neurology  [facility]  [address] [place] [id number]  Phone: [phone]  Fax: [phone]

## 2025-05-19 ENCOUNTER — APPOINTMENT (OUTPATIENT)
Dept: UROLOGY | Facility: CLINIC | Age: 70
End: 2025-05-19
Payer: MEDICARE

## 2025-06-03 ENCOUNTER — APPOINTMENT (OUTPATIENT)
Dept: VASCULAR SURGERY | Facility: CLINIC | Age: 70
End: 2025-06-03
Payer: MEDICARE

## 2025-07-01 ENCOUNTER — APPOINTMENT (OUTPATIENT)
Dept: VASCULAR SURGERY | Facility: CLINIC | Age: 70
End: 2025-07-01
Payer: MEDICARE

## 2025-09-07 LAB
ANION GAP SERPL CALCULATED.4IONS-SCNC: 7 MMOL/L (CALC) (ref 7–17)
BASOPHILS # BLD AUTO: 41 CELLS/UL (ref 0–200)
BASOPHILS NFR BLD AUTO: 0.9 %
BUN SERPL-MCNC: 29 MG/DL (ref 7–25)
BUN/CREAT SERPL: 33 (CALC) (ref 6–22)
CALCIUM SERPL-MCNC: 9.7 MG/DL (ref 8.6–10.3)
CHLORIDE SERPL-SCNC: 103 MMOL/L (ref 98–110)
CO2 SERPL-SCNC: 31 MMOL/L (ref 20–32)
CREAT SERPL-MCNC: 0.87 MG/DL (ref 0.7–1.28)
EGFRCR SERPLBLD CKD-EPI 2021: 92 ML/MIN/1.73M2
EOSINOPHIL # BLD AUTO: 59 CELLS/UL (ref 15–500)
EOSINOPHIL NFR BLD AUTO: 1.3 %
ERYTHROCYTE [DISTWIDTH] IN BLOOD BY AUTOMATED COUNT: 11.7 % (ref 11–15)
GLUCOSE SERPL-MCNC: 93 MG/DL (ref 65–99)
HCT VFR BLD AUTO: 43.9 % (ref 38.5–50)
HGB BLD-MCNC: 14.7 G/DL (ref 13.2–17.1)
LYMPHOCYTES # BLD AUTO: 1251 CELLS/UL (ref 850–3900)
LYMPHOCYTES NFR BLD AUTO: 27.8 %
MCH RBC QN AUTO: 33 PG (ref 27–33)
MCHC RBC AUTO-ENTMCNC: 33.5 G/DL (ref 32–36)
MCV RBC AUTO: 98.4 FL (ref 80–100)
MONOCYTES # BLD AUTO: 500 CELLS/UL (ref 200–950)
MONOCYTES NFR BLD AUTO: 11.1 %
NEUTROPHILS # BLD AUTO: 2651 CELLS/UL (ref 1500–7800)
NEUTROPHILS NFR BLD AUTO: 58.9 %
PLATELET # BLD AUTO: 201 THOUSAND/UL (ref 140–400)
PMV BLD REES-ECKER: 11.8 FL (ref 7.5–12.5)
POTASSIUM SERPL-SCNC: 4.2 MMOL/L (ref 3.5–5.3)
PSA SERPL-MCNC: 4.7 NG/ML
RBC # BLD AUTO: 4.46 MILLION/UL (ref 4.2–5.8)
SODIUM SERPL-SCNC: 141 MMOL/L (ref 135–146)
WBC # BLD AUTO: 4.5 THOUSAND/UL (ref 3.8–10.8)

## 2025-09-15 ENCOUNTER — APPOINTMENT (OUTPATIENT)
Dept: PRIMARY CARE | Facility: CLINIC | Age: 70
End: 2025-09-15
Payer: MEDICARE

## 2025-09-23 ENCOUNTER — APPOINTMENT (OUTPATIENT)
Dept: NEUROLOGY | Facility: CLINIC | Age: 70
End: 2025-09-23
Payer: MEDICARE

## 2025-11-13 ENCOUNTER — APPOINTMENT (OUTPATIENT)
Dept: DERMATOLOGY | Facility: CLINIC | Age: 70
End: 2025-11-13
Payer: MEDICARE

## (undated) DEVICE — GOWN, ASTOUND, L

## (undated) DEVICE — NEEDLE, SPINAL, LONG, 22 G X 5 IN, BLACK HUB

## (undated) DEVICE — GOWN, ASTOUND, XXL

## (undated) DEVICE — SPONGE, GAUZE, RADIOPAQUE, 12 PLY, 4 X 8 IN, STERILE, LF

## (undated) DEVICE — PAD, SANITARY, MAXI, U BY KOTEX, REG, LF

## (undated) DEVICE — SYRINGE, 35 CC, LUER LOCK, MONOJECT, LF

## (undated) DEVICE — LABELS, OR GENERAL, W/MARKER

## (undated) DEVICE — DRESSING, NON-ADHERENT, TELFA, OUCHLESS, 3 X 8 IN, STERILE

## (undated) DEVICE — APPLICATOR, PREP, CHLORAPREP, W/ORANGE TINT, 10.5ML

## (undated) DEVICE — COVER, TABLE, 44X90

## (undated) DEVICE — DRAPE, LEGGINGS, 28.5 X 43 IN, DISPOSABLE, LF, STERILE

## (undated) DEVICE — NEEDLE, BIOPSY, MAX CORE, 18G

## (undated) DEVICE — GLOVE, SURGICAL, PROTEXIS PI , 8.0, PF, LF

## (undated) DEVICE — PANTY, MESH, LARGE